# Patient Record
Sex: FEMALE | Race: WHITE
[De-identification: names, ages, dates, MRNs, and addresses within clinical notes are randomized per-mention and may not be internally consistent; named-entity substitution may affect disease eponyms.]

---

## 2021-09-13 ENCOUNTER — APPOINTMENT (OUTPATIENT)
Dept: INTERNAL MEDICINE | Facility: CLINIC | Age: 66
End: 2021-09-13
Payer: MEDICAID

## 2021-09-13 ENCOUNTER — NON-APPOINTMENT (OUTPATIENT)
Age: 66
End: 2021-09-13

## 2021-09-13 VITALS
DIASTOLIC BLOOD PRESSURE: 68 MMHG | HEART RATE: 66 BPM | OXYGEN SATURATION: 96 % | WEIGHT: 110 LBS | BODY MASS INDEX: 20.24 KG/M2 | RESPIRATION RATE: 17 BRPM | SYSTOLIC BLOOD PRESSURE: 114 MMHG | TEMPERATURE: 97.8 F | HEIGHT: 62 IN

## 2021-09-13 DIAGNOSIS — M19.049 PRIMARY OSTEOARTHRITIS, UNSPECIFIED HAND: ICD-10-CM

## 2021-09-13 DIAGNOSIS — Z23 ENCOUNTER FOR IMMUNIZATION: ICD-10-CM

## 2021-09-13 DIAGNOSIS — Z82.49 FAMILY HISTORY OF ISCHEMIC HEART DISEASE AND OTHER DISEASES OF THE CIRCULATORY SYSTEM: ICD-10-CM

## 2021-09-13 DIAGNOSIS — L29.9 PRURITUS, UNSPECIFIED: ICD-10-CM

## 2021-09-13 DIAGNOSIS — Z87.01 PERSONAL HISTORY OF PNEUMONIA (RECURRENT): ICD-10-CM

## 2021-09-13 DIAGNOSIS — K59.09 OTHER CONSTIPATION: ICD-10-CM

## 2021-09-13 DIAGNOSIS — Z80.8 FAMILY HISTORY OF MALIGNANT NEOPLASM OF OTHER ORGANS OR SYSTEMS: ICD-10-CM

## 2021-09-13 DIAGNOSIS — Z83.49 FAMILY HISTORY OF OTHER ENDOCRINE, NUTRITIONAL AND METABOLIC DISEASES: ICD-10-CM

## 2021-09-13 PROCEDURE — 90686 IIV4 VACC NO PRSV 0.5 ML IM: CPT

## 2021-09-13 PROCEDURE — 36415 COLL VENOUS BLD VENIPUNCTURE: CPT

## 2021-09-13 PROCEDURE — 99386 PREV VISIT NEW AGE 40-64: CPT | Mod: 25

## 2021-09-13 PROCEDURE — G0008: CPT

## 2021-09-13 PROCEDURE — 93000 ELECTROCARDIOGRAM COMPLETE: CPT | Mod: 59

## 2021-09-13 PROCEDURE — 99204 OFFICE O/P NEW MOD 45 MIN: CPT | Mod: 25

## 2021-09-13 PROCEDURE — G0444 DEPRESSION SCREEN ANNUAL: CPT | Mod: NC,59

## 2021-09-13 PROCEDURE — G0442 ANNUAL ALCOHOL SCREEN 15 MIN: CPT | Mod: NC,59

## 2021-09-13 RX ORDER — LORATADINE 10 MG
17 TABLET,DISINTEGRATING ORAL
Refills: 0 | Status: ACTIVE | COMMUNITY
Start: 2021-09-13

## 2021-09-13 RX ORDER — SODIUM CHLORIDE 50 MG/G
5 OINTMENT OPHTHALMIC
Refills: 0 | Status: ACTIVE | COMMUNITY
Start: 2021-09-13

## 2021-09-13 RX ORDER — ALPRAZOLAM 1 MG/1
1 TABLET ORAL
Qty: 30 | Refills: 0 | Status: ACTIVE | COMMUNITY
Start: 2021-08-04

## 2021-09-14 LAB
25(OH)D3 SERPL-MCNC: 29.4 NG/ML
ALBUMIN SERPL ELPH-MCNC: 4.5 G/DL
ALP BLD-CCNC: 80 U/L
ALT SERPL-CCNC: 18 U/L
ANION GAP SERPL CALC-SCNC: 14 MMOL/L
APPEARANCE: CLEAR
AST SERPL-CCNC: 22 U/L
BASOPHILS # BLD AUTO: 0.06 K/UL
BASOPHILS NFR BLD AUTO: 0.9 %
BILIRUB SERPL-MCNC: 0.6 MG/DL
BILIRUBIN URINE: NEGATIVE
BLOOD URINE: NEGATIVE
BUN SERPL-MCNC: 17 MG/DL
CALCIUM SERPL-MCNC: 9.5 MG/DL
CHLORIDE SERPL-SCNC: 99 MMOL/L
CHOLEST SERPL-MCNC: 236 MG/DL
CO2 SERPL-SCNC: 25 MMOL/L
COLOR: COLORLESS
COVID-19 SPIKE DOMAIN ANTIBODY INTERPRETATION: POSITIVE
CREAT SERPL-MCNC: 0.56 MG/DL
EOSINOPHIL # BLD AUTO: 0.22 K/UL
EOSINOPHIL NFR BLD AUTO: 3.2 %
ESTIMATED AVERAGE GLUCOSE: 114 MG/DL
GLUCOSE QUALITATIVE U: NEGATIVE
GLUCOSE SERPL-MCNC: 91 MG/DL
HBA1C MFR BLD HPLC: 5.6 %
HCT VFR BLD CALC: 42 %
HDLC SERPL-MCNC: 110 MG/DL
HGB BLD-MCNC: 13.4 G/DL
IMM GRANULOCYTES NFR BLD AUTO: 0.4 %
KETONES URINE: NEGATIVE
LDLC SERPL CALC-MCNC: 117 MG/DL
LEUKOCYTE ESTERASE URINE: NEGATIVE
LYMPHOCYTES # BLD AUTO: 2.08 K/UL
LYMPHOCYTES NFR BLD AUTO: 30.3 %
MAN DIFF?: NORMAL
MCHC RBC-ENTMCNC: 31.1 PG
MCHC RBC-ENTMCNC: 31.9 GM/DL
MCV RBC AUTO: 97.4 FL
MONOCYTES # BLD AUTO: 0.59 K/UL
MONOCYTES NFR BLD AUTO: 8.6 %
NEUTROPHILS # BLD AUTO: 3.89 K/UL
NEUTROPHILS NFR BLD AUTO: 56.6 %
NITRITE URINE: NEGATIVE
NONHDLC SERPL-MCNC: 126 MG/DL
PH URINE: 7.5
PLATELET # BLD AUTO: 294 K/UL
POTASSIUM SERPL-SCNC: 4.4 MMOL/L
PROT SERPL-MCNC: 6.9 G/DL
PROTEIN URINE: NEGATIVE
RBC # BLD: 4.31 M/UL
RBC # FLD: 12.2 %
SARS-COV-2 AB SERPL IA-ACNC: >250 U/ML
SODIUM SERPL-SCNC: 138 MMOL/L
SPECIFIC GRAVITY URINE: 1.01
TRIGL SERPL-MCNC: 48 MG/DL
TSH SERPL-ACNC: 2.01 UIU/ML
UROBILINOGEN URINE: NORMAL
VIT B12 SERPL-MCNC: 641 PG/ML
WBC # FLD AUTO: 6.87 K/UL

## 2021-09-18 PROBLEM — K59.09 CHRONIC CONSTIPATION: Status: ACTIVE | Noted: 2021-09-18

## 2021-09-18 PROBLEM — M19.049 OSTEOARTHRITIS, HAND: Status: ACTIVE | Noted: 2021-09-18

## 2021-09-18 NOTE — PHYSICAL EXAM
[No Acute Distress] : no acute distress [Well Nourished] : well nourished [Well Developed] : well developed [Well-Appearing] : well-appearing [Normal Sclera/Conjunctiva] : normal sclera/conjunctiva [PERRL] : pupils equal round and reactive to light [EOMI] : extraocular movements intact [Normal Outer Ear/Nose] : the outer ears and nose were normal in appearance [Normal Oropharynx] : the oropharynx was normal [No JVD] : no jugular venous distention [No Lymphadenopathy] : no lymphadenopathy [Supple] : supple [Thyroid Normal, No Nodules] : the thyroid was normal and there were no nodules present [No Respiratory Distress] : no respiratory distress  [No Accessory Muscle Use] : no accessory muscle use [Clear to Auscultation] : lungs were clear to auscultation bilaterally [Normal Rate] : normal rate  [Regular Rhythm] : with a regular rhythm [Normal S1, S2] : normal S1 and S2 [No Murmur] : no murmur heard [No Carotid Bruits] : no carotid bruits [No Abdominal Bruit] : a ~M bruit was not heard ~T in the abdomen [No Varicosities] : no varicosities [Pedal Pulses Present] : the pedal pulses are present [No Edema] : there was no peripheral edema [No Palpable Aorta] : no palpable aorta [No Extremity Clubbing/Cyanosis] : no extremity clubbing/cyanosis [Normal Appearance] : normal in appearance [No Nipple Discharge] : no nipple discharge [No Axillary Lymphadenopathy] : no axillary lymphadenopathy [Soft] : abdomen soft [Non Tender] : non-tender [Non-distended] : non-distended [No Masses] : no abdominal mass palpated [No HSM] : no HSM [Normal Bowel Sounds] : normal bowel sounds [Normal Posterior Cervical Nodes] : no posterior cervical lymphadenopathy [Normal Anterior Cervical Nodes] : no anterior cervical lymphadenopathy [No CVA Tenderness] : no CVA  tenderness [No Spinal Tenderness] : no spinal tenderness [No Joint Swelling] : no joint swelling [Grossly Normal Strength/Tone] : grossly normal strength/tone [No Rash] : no rash [Coordination Grossly Intact] : coordination grossly intact [No Focal Deficits] : no focal deficits [Normal Gait] : normal gait [Deep Tendon Reflexes (DTR)] : deep tendon reflexes were 2+ and symmetric [Normal Affect] : the affect was normal [Normal Insight/Judgement] : insight and judgment were intact [de-identified] : diffuse oa changes

## 2021-09-18 NOTE — ASSESSMENT
[FreeTextEntry1] : Gen. Pruritis- cont gabapentin\par Corneal Abrasion- f/u with ophto, cont dorina\par Osteoarthritis - ortho f/u, tylenol\par Anxiety- stable, xanax use sparingly\par Chronic Constipation- fluids/fiber/ exercise, miralax, GI f/u\par Eczema- elocon\par Chronic back pain- dr jasbir munoz, sympt tx\par \par Discussed healthy lifestyle, updated vaccinations, healthy diet, exercise, chk labs, discussed appropriate screening tests including colonoscopy, mammo, bone density and pap.\par

## 2021-09-18 NOTE — HEALTH RISK ASSESSMENT
[No] : No [Patient reported mammogram was normal] : Patient reported mammogram was normal [Patient reported PAP Smear was normal] : Patient reported PAP Smear was normal [Patient reported bone density results were abnormal] : Patient reported bone density results were abnormal [Patient reported colonoscopy was normal] : Patient reported colonoscopy was normal [Good] : ~his/her~  mood as  good [] : No [0] : 2) Feeling down, depressed, or hopeless: Not at all (0) [PHQ-2 Negative - No further assessment needed] : PHQ-2 Negative - No further assessment needed [Audit-CScore] : 0 [[id number]] : 0 [None] : None [With Significant Other] : lives with significant other [] :  [Fully functional (bathing, dressing, toileting, transferring, walking, feeding)] : Fully functional (bathing, dressing, toileting, transferring, walking, feeding) [Fully functional (using the telephone, shopping, preparing meals, housekeeping, doing laundry, using] : Fully functional and needs no help or supervision to perform IADLs (using the telephone, shopping, preparing meals, housekeeping, doing laundry, using transportation, managing medications and managing finances) [Reports changes in hearing] : Reports no changes in hearing [Reports changes in vision] : Reports no changes in vision [MammogramDate] : 2021 [PapSmearDate] : 2021 [BoneDensityDate] : 2011 [BoneDensityComments] : osteoperosis [ColonoscopyDate] : 2019

## 2021-09-18 NOTE — HISTORY OF PRESENT ILLNESS
[FreeTextEntry1] : Here for first visit to establish care.  [de-identified] : glaucoma\par corneal abrasion\par chronic constipation\par chronic back pain-uses topical heating patch, doesn’t really have any radic sympt. no b/b incont. \par oa hands.\par diffuse eczema- sites vary, elocon works.\par anxiety- mild/mod, able to function- dealing with husbands illness, financial issues- bothers her at night, gives her insomnia. \par chronic knee pain bilat- saw ortho. ? arthritis.

## 2021-11-24 ENCOUNTER — APPOINTMENT (OUTPATIENT)
Dept: PHYSICAL MEDICINE AND REHAB | Facility: CLINIC | Age: 66
End: 2021-11-24
Payer: OTHER MISCELLANEOUS

## 2021-11-24 VITALS
HEART RATE: 66 BPM | BODY MASS INDEX: 18.58 KG/M2 | RESPIRATION RATE: 16 BRPM | DIASTOLIC BLOOD PRESSURE: 78 MMHG | WEIGHT: 101 LBS | SYSTOLIC BLOOD PRESSURE: 130 MMHG | OXYGEN SATURATION: 96 % | HEIGHT: 62 IN | TEMPERATURE: 97.5 F

## 2021-11-24 DIAGNOSIS — S20.211A CONTUSION OF RIGHT FRONT WALL OF THORAX, INITIAL ENCOUNTER: ICD-10-CM

## 2021-11-24 PROCEDURE — 99072 ADDL SUPL MATRL&STAF TM PHE: CPT

## 2021-11-24 PROCEDURE — 99205 OFFICE O/P NEW HI 60 MIN: CPT

## 2021-11-28 PROBLEM — S20.211A CONTUSION OF RIB, RIGHT, INITIAL ENCOUNTER: Status: ACTIVE | Noted: 2021-11-24

## 2021-11-28 NOTE — HISTORY OF PRESENT ILLNESS
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  No LOc.  she landed on her knees.  She had pain in the left side of her upper back and her right knee.  She had a bruise in the right upper back and left knee.  \par She did not go to the hospital from r the scene.\par She thought her pains would resolve. The bruises resolved.  The left knee pain is improving.  She has pain in the right upper back\par The pain continues and she presents for an evaluation\par \par upper back pain right ribs and shoulder\par Pain:  7/10 Worse: 7/10 Quality: sharp  Frequency: constant\par The pain starts over the right lateral upper back over the shoulder blade and radiates to the front of her chest\par The pain is worse with sudden twist/straightening of the trunk.  Laying on the right side also increases pain\par \par Left knee pain\par Pain: 5/10 quality: Deep ache resolving ecchymosis\par The pain starts over the medial knee without radiation.  The pain is worse with touch and weight-bear through the knee.  She denies knee buckling.\par \par she does not like to take pain medication\par \par No WC case previously\par No upper back injury\par Functional deficits: She can perform ADLs but slowly.  She has increased pain with extremity dress and use of the right arm for ADLs such as brushing her teeth and combing her hair\par \par She did not take time off work\par She drove to the office\par \par \par  \par Functional deficit\par pushing off the arms\par takes longer to perform adls\par

## 2021-11-28 NOTE — PHYSICAL EXAM
[FreeTextEntry1] : Pleasant, in no distress. Language: English\par HEENT: Head: no trauma. Eyes: no discharge. Ears: No discharge. Nose No discharge. Throat: clear\par Neck: FAROM. Negative Spurlings\par Heart: RR, +S1, S2\par Lungs: CTA\par Abdomen: soft, NT\par Lumbar spine: FAROM, no spasm\par \par LUE: Shoulder: FAROM, MS 5/5\par Elbow: FAROM, MS 5/5 reflexes 2/4\par Wrist: FAROM, MS 5/5 reflexes 2/4\par Warm, nontender, pulse 2+\par \par RUE:Shoulder:FAROM MS 4+/5 to palpation of the left lateral shoulder and over the shoulder blade.  No ecchymosis.\par Elbow: FAROM, MS 5/5 reflexes 2/4\par Wrist: FAROM, MS 5/5 reflexes 2/4\par Warm, nontender, pulse 2+\par \par LLE: Hip: FAROM, MS 5/5\par Knee: FAROM, MS 4+/5 tender to touch.  No instability.  Tender over the medial knee.  Negative Dimas's.  Resolving ecchymosis.  Reflexes 2/4\par Ankle: FAROM, MS 5/5 reflexes 2/4\par Warm , nontender, pulse 2+ negative homans\par \par RLE: Hip: FAROM, MS 5/5\par Knee: FAROM, MS 5/5 reflexes 2/4\par Ankle: FAROM, MS 5/5 reflexes 2/4\par Warm , nontender, pulse 2+ negative homans\par \par Gait: Spontaneous, reciprocal, safe without an assistive device\par \par Sensation\par RUE: sensation is intact to light touch, pinprick  and proprioception\par LUE: sensation is intact to light touch, pinprick  and proprioception\par RLE: sensation is intact to light touch, pinprick  and proprioception. Neg SLR. Neg JOSE RAMON, Neg FADIR\par LLE: sensation is intact to light touch, pinprick  and proprioception. Neg SLR. Neg JOSE RAMON, Neg FADIR\par \par

## 2021-12-23 ENCOUNTER — APPOINTMENT (OUTPATIENT)
Dept: PHYSICAL MEDICINE AND REHAB | Facility: CLINIC | Age: 66
End: 2021-12-23
Payer: OTHER MISCELLANEOUS

## 2021-12-23 VITALS
WEIGHT: 100 LBS | DIASTOLIC BLOOD PRESSURE: 75 MMHG | HEART RATE: 65 BPM | OXYGEN SATURATION: 96 % | HEIGHT: 62 IN | BODY MASS INDEX: 18.4 KG/M2 | SYSTOLIC BLOOD PRESSURE: 116 MMHG | RESPIRATION RATE: 16 BRPM | TEMPERATURE: 96.6 F

## 2021-12-23 DIAGNOSIS — M54.6 PAIN IN THORACIC SPINE: ICD-10-CM

## 2021-12-23 PROCEDURE — 99072 ADDL SUPL MATRL&STAF TM PHE: CPT

## 2021-12-23 PROCEDURE — 99214 OFFICE O/P EST MOD 30 MIN: CPT

## 2021-12-27 PROBLEM — M54.6 THORACIC SPINE PAIN: Status: ACTIVE | Noted: 2021-11-24

## 2021-12-27 NOTE — DATA REVIEWED
[FreeTextEntry1] : Rib x-rays performed on 11/24/2021 reveals no fracture\par \par X-rays of the thoracic spine performed on 11/24/2021 reveals mild left adult scoliosis Detail Level: Generalized Quality 130: Documentation Of Current Medications In The Medical Record: Current Medications Documented

## 2021-12-27 NOTE — HISTORY OF PRESENT ILLNESS
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  No LOC.  She landed on her knees.  She had pain in the left side of her upper back and her right knee.  She had a bruise in the right upper back and left knee.  \par She did not go to the hospital from  the scene.\par She thought her pains would resolve. The bruises resolved.  The left knee pain is improving.  She has pain in the right upper back\par The pain continues and she presents for an evaluation\par She works at [employer].  She has been working long hours due to the [place] holiday season.  She has not been able to attend restorative physical therapy\par \par upper back pain right ribs and shoulder\par Pain:  8/10 Worse: 7/10 Quality: sharp  Frequency: constant\par The pain starts over the right lateral upper back over the shoulder blade and radiates to the front of her chest\par The pain is worse with sudden twist/straightening of the trunk.  Laying on the right side also increases pain\par Lifting more than 10 pounds with her arms increases her pain\par \par Left knee pain\par Pain: 4/10 quality: Deep ache resolving ecchymosis\par The pain starts over the medial knee without radiation.  The pain is worse with touch and weight-bear through the knee.  She denies knee buckling.  She has pain with kneeling and use of stairs.\par \par She does not like to take pain medication\par \par No WC case previously\par No upper back injury\par Functional deficits: She can perform ADLs but slowly.  She has increased pain with extremity dress and use of the right arm for ADLs such as brushing her teeth and combing her hair\par \par She did not take time off work\par She drove to the office\par \par meloxicam 15 due to work \par  \par Functional deficit\par pushing off the arms\par takes longer to perform adls\par

## 2021-12-27 NOTE — PHYSICAL EXAM
[FreeTextEntry1] : Pleasant, in no distress. Language: English\par HEENT: Head: no trauma. Eyes: no discharge. Ears: No discharge. Nose No discharge. Throat: clear\par Neck: FAROM. Negative Spurlings\par Heart: RR, +S1, S2\par Lungs: CTA\par Abdomen: soft, NT\par Thoracic spine.  She has tenderness on palpation of the paraspinal muscles.\par Lumbar spine: FAROM, no spasm\par \par LUE: Shoulder: FAROM, MS 5/5\par Elbow: FAROM, MS 5/5 reflexes 2/4\par Wrist: FAROM, MS 5/5 reflexes 2/4\par Warm, nontender, pulse 2+\par \par RUE:Shoulder:FAROM MS 4+/5 to palpation of the left lateral shoulder and over the shoulder blade.  No ecchymosis.\par Elbow: FAROM, MS 5/5 reflexes 2/4\par Wrist: FAROM, MS 5/5 reflexes 2/4\par Warm, nontender, pulse 2+\par \par LLE: Hip: FAROM, MS 5/5\par Knee: FAROM, MS 4+/5 tender to touch.  No instability.  Tender over the medial knee.  Negative Dimas's.  Resolving ecchymosis.  Reflexes 2/4\par Ankle: FAROM, MS 5/5 reflexes 2/4\par Warm , nontender, pulse 2+ negative homans\par \par RLE: Hip: FAROM, MS 5/5\par Knee: FAROM, MS 5/5 reflexes 2/4\par Ankle: FAROM, MS 5/5 reflexes 2/4\par Warm , nontender, pulse 2+ negative homans\par \par Gait: Spontaneous, reciprocal, safe without an assistive device\par \par Sensation\par RUE: sensation is intact to light touch, pinprick  and proprioception\par LUE: sensation is intact to light touch, pinprick  and proprioception\par RLE: sensation is intact to light touch, pinprick  and proprioception. Neg SLR. Neg JOSE RAMON, Neg FADIR\par LLE: sensation is intact to light touch, pinprick  and proprioception. Neg SLR. Neg JOSE RAMON, Neg FADIR\par \par

## 2022-01-24 ENCOUNTER — APPOINTMENT (OUTPATIENT)
Dept: PHYSICAL MEDICINE AND REHAB | Facility: CLINIC | Age: 67
End: 2022-01-24
Payer: OTHER MISCELLANEOUS

## 2022-01-24 VITALS
SYSTOLIC BLOOD PRESSURE: 116 MMHG | DIASTOLIC BLOOD PRESSURE: 72 MMHG | BODY MASS INDEX: 18.58 KG/M2 | WEIGHT: 101 LBS | TEMPERATURE: 97.4 F | HEIGHT: 62 IN | HEART RATE: 68 BPM | OXYGEN SATURATION: 99 % | RESPIRATION RATE: 17 BRPM

## 2022-01-24 PROCEDURE — 99072 ADDL SUPL MATRL&STAF TM PHE: CPT

## 2022-01-24 PROCEDURE — 99213 OFFICE O/P EST LOW 20 MIN: CPT

## 2022-01-25 NOTE — DATA REVIEWED
[FreeTextEntry1] : Rib x-rays performed on 11/24/2021 reveals no fracture\par \par X-rays of the thoracic spine performed on 11/24/2021 reveals mild left adult scoliosis

## 2022-01-25 NOTE — PHYSICAL EXAM
[FreeTextEntry1] : Pleasant, in no distress. Language: English\par HEENT: Head: no trauma. Eyes: no discharge. Ears: No discharge. Nose No discharge. Throat: clear\par Neck: FAROM. Negative Spurlings\par Heart: RR, +S1, S2\par Lungs: CTA\par Abdomen: soft, NT\par Thoracic spine.  She has tenderness on palpation of the paraspinal musclesover to the lateral chest wall\par Lumbar spine: FAROM, no spasm\par \par LUE: Shoulder: FAROM, MS 5/5\par Elbow: FAROM, MS 5/5 reflexes 2/4\par Wrist: FAROM, MS 5/5 reflexes 2/4\par Warm, nontender, pulse 2+\par \par RUE:Shoulder:FAROM MS 4+/5 to palpation of the left lateral shoulder and over the shoulder blade.  No ecchymosis. tender with spasm in the interscapular muscles\par Elbow: FAROM, MS 5/5 reflexes 2/4\par Wrist: FAROM, MS 5/5 reflexes 2/4\par Warm, nontender, pulse 2+\par \par LLE: Hip: FAROM, MS 5/5\par Knee: FAROM, MS 4+/5 tender to touch.  No instability.  mild Tender over the medial knee.  Negative Dimas's.  Resolving ecchymosis.  Reflexes 2/4\par Ankle: FAROM, MS 5/5 reflexes 2/4\par Warm , nontender, pulse 2+ negative homans\par \par RLE: Hip: FAROM, MS 5/5\par Knee: FAROM, MS 5/5 reflexes 2/4\par Ankle: FAROM, MS 5/5 reflexes 2/4\par Warm , nontender, pulse 2+ negative homans\par \par Gait: Spontaneous, reciprocal, safe without an assistive device\par \par Sensation\par RUE: sensation is intact to light touch, pinprick  and proprioception\par LUE: sensation is intact to light touch, pinprick  and proprioception\par RLE: sensation is intact to light touch, pinprick  and proprioception. Neg SLR. Neg JOSE RAOMN, Neg FADIR\par LLE: sensation is intact to light touch, pinprick  and proprioception. Neg SLR. Neg JOSE RAMON, Neg FADIR\par \par

## 2022-01-25 NOTE — HISTORY OF PRESENT ILLNESS
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  No LOC.  She landed on her knees.  She had pain in the left side of her upper back and her right knee.  She had a bruise in the right upper back and left knee.  \par She did not go to the hospital from  the scene.\par She thought her pains would resolve. The bruises resolved.  The left knee pain is improving.  She has pain in the right upper back\par \par She works at [employer].  She has been working long hours due to the Christmas holiday season. The work has finally slowed down so now she can  attend therapy. She has beento   restorative physical therapy 3 sessions since her last office visit. Her pain is slightly less\par \par upper back pain right ribs and shoulder\par Pain: 6/10 Worse: 7/10 Quality: sharp  Frequency: constant\par The pain starts over the right lateral upper back over the shoulder blade and radiates to the front of her chest\par The pain is worse with sudden twist/straightening of the trunk.  Laying on the right side also increases pain\par Lifting more than 10 pounds with her arms increases her pain\par \par Left knee pain\par Pain: 1/10 quality: Deep ache resolving ecchymosis\par The pain starts over the medial knee without radiation.  The pain is worse with touch and weight-bear through the knee.  She denies knee buckling.  She has pain with kneeling and use of stairs.\par \par She does not like to take pain medication\par \par No WC case previously\par No upper back injury\par Functional deficits: She can perform ADLs but slowly.  She has increased pain with extremity dress and use of the right arm for ADLs such as brushing her teeth and combing her hair\par \par She did not take time off work\par She drove to the office\par \par meloxicam 15 due to work \par  \par Functional deficit\par pushing off the arms\par takes longer to perform adls\par

## 2022-03-02 ENCOUNTER — APPOINTMENT (OUTPATIENT)
Dept: PHYSICAL MEDICINE AND REHAB | Facility: CLINIC | Age: 67
End: 2022-03-02
Payer: OTHER MISCELLANEOUS

## 2022-03-02 DIAGNOSIS — M25.562 PAIN IN LEFT KNEE: ICD-10-CM

## 2022-03-02 PROCEDURE — 99072 ADDL SUPL MATRL&STAF TM PHE: CPT

## 2022-03-02 PROCEDURE — 99214 OFFICE O/P EST MOD 30 MIN: CPT

## 2022-03-06 PROBLEM — M25.562 ACUTE PAIN OF LEFT KNEE: Status: ACTIVE | Noted: 2021-11-28

## 2022-03-06 NOTE — REVIEW OF SYSTEMS
[Joint Pain] : joint pain [Joint Stiffness] : joint stiffness [Muscle Pain] : muscle pain [Fever] : no fever [Eye Pain] : no eye pain [Earache] : no earache [Chest Pain] : no chest pain [Shortness Of Breath] : no shortness of breath [Abdominal Pain] : no abdominal pain [Dysuria] : no dysuria [Skin Wound] : no skin wound [Insomnia] : no insomnia [Dizziness] : no dizziness [Easy Bruising] : no tendency for easy bruising

## 2022-03-06 NOTE — HISTORY OF PRESENT ILLNESS
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  No LOC.  She landed on her knees.  She had pain in the left side of her upper back and her right knee.  She had a bruise in the right upper back and left knee.  \par She did not go to the hospital from  the scene.\par She thought her pains would resolve. The bruises resolved.  The left knee pain is improving.  She has pain in the right upper back\par \par She works at [employer].  She has attended restorative physical therapy 2 times a week for 8 sessions since her last office visit.  11 sessions total.  Her left knee pain is improved her right shoulder pain remains strong and limits her ADLs\par \par upper back pain right ribs and shoulder\par Pain: 6/10 Worse: 7/10 Quality: sharp  Frequency: constant\par The pain starts over the right lateral upper back over the shoulder blade and radiates to the front of her chest\par The pain is worse with sudden twist/straightening of the trunk.  Laying on the right side also increases pain\par Lifting more than 10 pounds with her arms increases her pain\par \par Left knee pain\par Pain: 1/10 quality: Deep ache resolving ecchymosis\par The pain starts over the medial knee without radiation.  The pain is worse with touch and weight-bear through the knee.  She denies knee buckling.  She has pain with kneeling and use of stairs.\par \par She does not like to take pain medication\par \par No WC case previously\par No upper back injury\par Functional deficits: She can perform ADLs but slowly.  She has increased pain with extremity dress and use of the right arm for ADLs such as brushing her teeth and combing her hair\par \par She did not take time off work\par She drove to the office\par \par meloxicam 15 due to work \par  \par Functional deficit\par pushing off the arms\par takes longer to perform adls\par

## 2022-03-06 NOTE — HISTORY OF PRESENT ILLNESS
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  No LOC.  She landed on her knees.  She had pain in the left side of her upper back and her right knee.  She had a bruise in the right upper back and left knee.  \par She did not go to the hospital from  the scene.\par She thought her pains would resolve. The bruises resolved.  The left knee pain is improving.  She has pain in the right upper back\par \par She works at [employer].  She has attended restorative physical therapy 2 times a week for 8 sessions since her last office visit.  11 sessions total.  Her left knee pain is improved her right shoulder pain remains strong and limits her ADLs\par \par upper back pain right ribs and shoulder\par Pain: 6/10 Worse: 7/10 Quality: sharp  Frequency: constant\par The pain starts over the right lateral upper back over the shoulder blade and radiates to the front of her chest\par The pain is worse with sudden twist/straightening of the trunk.  Laying on the right side also increases pain\par Lifting more than 10 pounds with her arms increases her pain\par \par Left knee pain\par Pain: 1/10 quality: Deep ache resolving ecchymosis\par The pain starts over the medial knee without radiation.  The pain is worse with touch and weight-bear through the knee.  She denies knee buckling.  She has pain with kneeling and use of stairs.\par \par She does not like to take pain medication\par \par No WC case previously\par No upper back injury\par Functional deficits: She can perform ADLs but slowly.  She has increased pain with extremity dress and use of the right arm for ADLs such as brushing her teeth and combing her hair\par \par She did not take time off work\par She drove to the office\par \par meloxicam 15 due to work \par  \par Functional deficit\par pushing off the arms\par takes longer to perform adls\par

## 2022-03-06 NOTE — PHYSICAL EXAM
[FreeTextEntry1] : Pleasant, in no distress. Language: English\par HEENT: Head: no trauma. Eyes: no discharge. Ears: No discharge. Nose No discharge. Throat: clear\par Neck: FAROM. Negative Spurlings\par Heart: RR, +S1, S2\par Lungs: CTA\par Abdomen: soft, NT\par Thoracic spine.  She has tenderness on palpation of the paraspinal musclesover to the lateral chest wall\par Lumbar spine: FAROM, no spasm\par \par LUE: Shoulder: FAROM, MS 5/5\par Elbow: FAROM, MS 5/5 reflexes 2/4\par Wrist: FAROM, MS 5/5 reflexes 2/4\par Warm, nontender, pulse 2+\par \par RUE:Shoulder:FAROM MS 4+/5 to palpation of the left lateral shoulder and over the shoulder blade.  No ecchymosis. tender with spasm in the interscapular muscles\par Elbow: FAROM, MS 5/5 reflexes 2/4\par Wrist: FAROM, MS 5/5 reflexes 2/4\par Warm, nontender, pulse 2+\par \par LLE: Hip: FAROM, MS 5/5\par Knee: FAROM, MS 4+/5 tender to touch.  No instability.  mild Tender over the medial knee.  Negative Dimas's.  Resolving ecchymosis.  Reflexes 2/4\par Ankle: FAROM, MS 5/5 reflexes 2/4\par Warm , nontender, pulse 2+ negative homans\par \par RLE: Hip: FAROM, MS 5/5\par Knee: FAROM, MS 5/5 reflexes 2/4\par Ankle: FAROM, MS 5/5 reflexes 2/4\par Warm , nontender, pulse 2+ negative homans\par \par Gait: Spontaneous, reciprocal, safe without an assistive device\par \par Sensation\par RUE: sensation is intact to light touch, pinprick  and proprioception\par LUE: sensation is intact to light touch, pinprick  and proprioception\par RLE: sensation is intact to light touch, pinprick  and proprioception. Neg SLR. Neg JOSE RAMON, Neg FADIR\par LLE: sensation is intact to light touch, pinprick  and proprioception. Neg SLR. Neg JOSE RAMON, Neg FADIR\par \par

## 2022-11-07 ENCOUNTER — APPOINTMENT (OUTPATIENT)
Dept: INTERNAL MEDICINE | Facility: CLINIC | Age: 67
End: 2022-11-07

## 2022-11-14 ENCOUNTER — APPOINTMENT (OUTPATIENT)
Dept: PHYSICAL MEDICINE AND REHAB | Facility: CLINIC | Age: 67
End: 2022-11-14

## 2022-11-14 DIAGNOSIS — M54.12 RADICULOPATHY, CERVICAL REGION: ICD-10-CM

## 2022-11-14 PROCEDURE — 99214 OFFICE O/P EST MOD 30 MIN: CPT

## 2022-11-14 PROCEDURE — 99072 ADDL SUPL MATRL&STAF TM PHE: CPT

## 2022-11-16 PROBLEM — M54.12 CERVICAL RADICULOPATHY: Status: ACTIVE | Noted: 2022-11-16

## 2022-11-16 NOTE — HISTORY OF PRESENT ILLNESS
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 \par \par She was in the process of pulling a full heavy rack of returned clothes. The wheels on the rack became stuck in the back would not move. , She pulled in the rack to get it to move. She had a sharp pain in the right shoulder and neck.\par She tried to treat with rest, Advil/Tylenol and avoiding tasks that would aggravate right neck and shoulder\par She has persistent pain and presents for an evaluation\par \par Pain:  8/10 Worse: 10/10 Quality: sharp  Frequency: constant\par the pain starts in the front of the front of the shoulder radiates down to the arm to the elbow and to the neck\par The pain is aggravated with use of her arm away from the body.  \par \par She previously had a Worker's Comp. injury on November 7, 2021 along the upper back and right shoulder.  She required restorative physical therapy.  She stopped therapy in March 2022.  She returned to work at full work capacity without further need of medical care\par \par She has no previous injuries or Worker's Comp. cases to her neck\par \par Functional deficits: She can perform ADLs but slowly.  She has increased pain with extremity dress and use of the right arm for ADLs such as brushing her teeth and combing her hair pushing off arms and \par \par She did not take time off from work.\par She drove to the office.\par She does not like to take pain medications

## 2022-12-13 ENCOUNTER — NON-APPOINTMENT (OUTPATIENT)
Age: 67
End: 2022-12-13

## 2022-12-13 ENCOUNTER — APPOINTMENT (OUTPATIENT)
Dept: INTERNAL MEDICINE | Facility: CLINIC | Age: 67
End: 2022-12-13

## 2022-12-13 VITALS
HEART RATE: 78 BPM | SYSTOLIC BLOOD PRESSURE: 112 MMHG | TEMPERATURE: 96.9 F | WEIGHT: 101 LBS | RESPIRATION RATE: 17 BRPM | BODY MASS INDEX: 18.58 KG/M2 | HEIGHT: 62 IN | DIASTOLIC BLOOD PRESSURE: 72 MMHG | OXYGEN SATURATION: 99 %

## 2022-12-13 DIAGNOSIS — L30.9 DERMATITIS, UNSPECIFIED: ICD-10-CM

## 2022-12-13 DIAGNOSIS — S05.00XA INJURY OF CONJUNCTIVA AND CORNEAL ABRASION W/OUT FOREIGN BODY, UNSPECIFIED EYE, INITIAL ENCOUNTER: ICD-10-CM

## 2022-12-13 PROCEDURE — 36415 COLL VENOUS BLD VENIPUNCTURE: CPT

## 2022-12-13 PROCEDURE — 99213 OFFICE O/P EST LOW 20 MIN: CPT | Mod: 25

## 2022-12-13 PROCEDURE — 90662 IIV NO PRSV INCREASED AG IM: CPT

## 2022-12-13 PROCEDURE — 93000 ELECTROCARDIOGRAM COMPLETE: CPT | Mod: 59

## 2022-12-13 PROCEDURE — G0402 INITIAL PREVENTIVE EXAM: CPT

## 2022-12-13 PROCEDURE — G0008: CPT

## 2022-12-13 RX ORDER — DICLOFENAC SODIUM 1% 10 MG/G
1 GEL TOPICAL
Qty: 150 | Refills: 0 | Status: DISCONTINUED | COMMUNITY
Start: 2022-03-02 | End: 2022-12-13

## 2022-12-13 RX ORDER — MELOXICAM 15 MG/1
15 TABLET ORAL
Qty: 1 | Refills: 0 | Status: DISCONTINUED | COMMUNITY
Start: 2022-11-14 | End: 2022-12-13

## 2022-12-13 RX ORDER — GABAPENTIN 300 MG/1
300 CAPSULE ORAL
Refills: 0 | Status: DISCONTINUED | COMMUNITY
Start: 2021-01-05 | End: 2022-12-13

## 2022-12-13 RX ORDER — MELOXICAM 15 MG/1
15 TABLET ORAL
Qty: 30 | Refills: 0 | Status: DISCONTINUED | COMMUNITY
Start: 2021-11-24 | End: 2022-12-13

## 2022-12-13 NOTE — ASSESSMENT
[FreeTextEntry1] : Corneal Abrasion- f/u with ophto, cont dorina\par Osteoarthritis - ortho f/u, tylenol\par Anxiety- stable, xanax use sparingly\par Chronic Constipation- fluids/fiber/ exercise, miralax, GI f/u\par Eczema- elocon\par \par Discussed healthy lifestyle, updated vaccinations, healthy diet, exercise, chk labs, discussed appropriate screening tests including colonoscopy, mammo, bone density and pap.\par Discussed osteoperosis screening at length- x 10 min . \par Discussed the importance and benefit of a healthy lifestyle including a heart healthy diet such as the Mediterranean diet and regular exercise to try to lose weight as well as 7 hours of sleep nightly.\par \par  Abdominal distention

## 2022-12-13 NOTE — HISTORY OF PRESENT ILLNESS
[FreeTextEntry1] : rosetta [de-identified] : mild intermit asthma- uses alb mdi jst a few times a year, change of seasons. \par Uses xanax very infreq for anxiety/stress. just a few times a year. \par Right Shoulder pain - on the job, seeing dr martell- going to ptx. \par pfizer 3,4,5. \par shingrix x2\par pneumovax 23\par prevnar 13\par corneal scarring recurring -sees specialist\par h/o retinal tears- sees specialist\par lifelong constipaiton- miralax effective. \par eczema- moisturizes, elocon for eruptions.

## 2022-12-13 NOTE — HEALTH RISK ASSESSMENT
[Good] : ~his/her~  mood as  good [Never] : Never [No] : In the past 12 months have you used drugs other than those required for medical reasons? No [No falls in past year] : Patient reported no falls in the past year [0] : 2) Feeling down, depressed, or hopeless: Not at all (0) [PHQ-2 Negative - No further assessment needed] : PHQ-2 Negative - No further assessment needed [Patient declined bone density test] : Patient declined bone density test [None] : None [With Family] : lives with family [Employed] : employed [] :  [Fully functional (bathing, dressing, toileting, transferring, walking, feeding)] : Fully functional (bathing, dressing, toileting, transferring, walking, feeding) [Fully functional (using the telephone, shopping, preparing meals, housekeeping, doing laundry, using] : Fully functional and needs no help or supervision to perform IADLs (using the telephone, shopping, preparing meals, housekeeping, doing laundry, using transportation, managing medications and managing finances) [Relationship: ___] : Relationship: [unfilled] [Audit-CScore] : 0 [de-identified] : active but no exc [de-identified] : healthy [[id number]] : 0 [Change in mental status noted] : No change in mental status noted [Language] : denies difficulty with language [Behavior] : denies difficulty with behavior [Handling Complex Tasks] : denies difficulty handling complex tasks [Reasoning] : denies difficulty with reasoning [Reports changes in hearing] : Reports no changes in hearing [Reports changes in vision] : Reports no changes in vision [Reports changes in dental health] : Reports no changes in dental health [AdvancecareDate] : 12/13/22

## 2022-12-14 LAB
25(OH)D3 SERPL-MCNC: 22.8 NG/ML
ALBUMIN SERPL ELPH-MCNC: 4.5 G/DL
ALP BLD-CCNC: 84 U/L
ALT SERPL-CCNC: 16 U/L
ANION GAP SERPL CALC-SCNC: 9 MMOL/L
APPEARANCE: CLEAR
AST SERPL-CCNC: 22 U/L
BACTERIA: NEGATIVE
BASOPHILS # BLD AUTO: 0.04 K/UL
BASOPHILS NFR BLD AUTO: 0.6 %
BILIRUB SERPL-MCNC: 0.4 MG/DL
BILIRUBIN URINE: NEGATIVE
BLOOD URINE: NEGATIVE
BUN SERPL-MCNC: 17 MG/DL
CALCIUM SERPL-MCNC: 9.7 MG/DL
CHLORIDE SERPL-SCNC: 102 MMOL/L
CHOLEST SERPL-MCNC: 241 MG/DL
CO2 SERPL-SCNC: 27 MMOL/L
COLOR: COLORLESS
CREAT SERPL-MCNC: 0.57 MG/DL
EGFR: 101 ML/MIN/1.73M2
EOSINOPHIL # BLD AUTO: 0.08 K/UL
EOSINOPHIL NFR BLD AUTO: 1.2 %
ESTIMATED AVERAGE GLUCOSE: 120 MG/DL
GLUCOSE QUALITATIVE U: NEGATIVE
GLUCOSE SERPL-MCNC: 102 MG/DL
HBA1C MFR BLD HPLC: 5.8 %
HBV SURFACE AG SER QL: NONREACTIVE
HCT VFR BLD CALC: 43.6 %
HCV AB SER QL: NONREACTIVE
HCV S/CO RATIO: 0.13 S/CO
HDLC SERPL-MCNC: 105 MG/DL
HGB BLD-MCNC: 13.9 G/DL
HYALINE CASTS: 0 /LPF
IMM GRANULOCYTES NFR BLD AUTO: 0 %
KETONES URINE: NEGATIVE
LDLC SERPL CALC-MCNC: 127 MG/DL
LEUKOCYTE ESTERASE URINE: NEGATIVE
LYMPHOCYTES # BLD AUTO: 1.48 K/UL
LYMPHOCYTES NFR BLD AUTO: 21.5 %
MAN DIFF?: NORMAL
MCHC RBC-ENTMCNC: 30.8 PG
MCHC RBC-ENTMCNC: 31.9 GM/DL
MCV RBC AUTO: 96.5 FL
MICROSCOPIC-UA: NORMAL
MONOCYTES # BLD AUTO: 0.35 K/UL
MONOCYTES NFR BLD AUTO: 5.1 %
NEUTROPHILS # BLD AUTO: 4.92 K/UL
NEUTROPHILS NFR BLD AUTO: 71.6 %
NITRITE URINE: NEGATIVE
NONHDLC SERPL-MCNC: 136 MG/DL
PH URINE: 6
PLATELET # BLD AUTO: 303 K/UL
POTASSIUM SERPL-SCNC: 4.5 MMOL/L
PROT SERPL-MCNC: 7 G/DL
PROTEIN URINE: NEGATIVE
RBC # BLD: 4.52 M/UL
RBC # FLD: 12.8 %
RED BLOOD CELLS URINE: 1 /HPF
SODIUM SERPL-SCNC: 137 MMOL/L
SPECIFIC GRAVITY URINE: 1.01
SQUAMOUS EPITHELIAL CELLS: 0 /HPF
T3 SERPL-MCNC: 122 NG/DL
T4 FREE SERPL-MCNC: 1.2 NG/DL
TRIGL SERPL-MCNC: 49 MG/DL
TSH SERPL-ACNC: 2.49 UIU/ML
UROBILINOGEN URINE: NORMAL
VIT B12 SERPL-MCNC: 635 PG/ML
WBC # FLD AUTO: 6.87 K/UL
WHITE BLOOD CELLS URINE: 0 /HPF

## 2022-12-15 ENCOUNTER — NON-APPOINTMENT (OUTPATIENT)
Age: 67
End: 2022-12-15

## 2022-12-15 ENCOUNTER — APPOINTMENT (OUTPATIENT)
Dept: PHYSICAL MEDICINE AND REHAB | Facility: CLINIC | Age: 67
End: 2022-12-15

## 2022-12-15 LAB — BACTERIA UR CULT: NORMAL

## 2023-01-09 ENCOUNTER — APPOINTMENT (OUTPATIENT)
Dept: PHYSICAL MEDICINE AND REHAB | Facility: CLINIC | Age: 68
End: 2023-01-09

## 2023-01-11 ENCOUNTER — APPOINTMENT (OUTPATIENT)
Dept: PHYSICAL MEDICINE AND REHAB | Facility: CLINIC | Age: 68
End: 2023-01-11
Payer: OTHER MISCELLANEOUS

## 2023-01-11 VITALS
SYSTOLIC BLOOD PRESSURE: 110 MMHG | DIASTOLIC BLOOD PRESSURE: 80 MMHG | RESPIRATION RATE: 17 BRPM | WEIGHT: 101 LBS | HEIGHT: 62 IN | TEMPERATURE: 97.2 F | HEART RATE: 70 BPM | OXYGEN SATURATION: 97 % | BODY MASS INDEX: 18.58 KG/M2

## 2023-01-11 PROCEDURE — 99213 OFFICE O/P EST LOW 20 MIN: CPT

## 2023-01-12 NOTE — HISTORY OF PRESENT ILLNESS
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 \par \par She was in the process of pulling a full heavy rack of returned clothes. The wheels on the rack became stuck in the back would not move. She pulled in the rack to get it to move. She had a sharp pain in the right shoulder and neck.\par She tried to treat with rest, Advil/Tylenol and avoiding tasks that would aggravate right neck and shoulder\par She has persistent pain and presents for an evaluation\par \par She attended restorative physical therapy for 4 sessions since her last office visit\par She could not go to therapy due to her very busy and unpredictable work schedule due to the [place] holiday season.  Her job will call her in for extra shifts.\par \par Pain:  6/10 Worse: 10/10 Quality: sharp  Frequency: constant\par Therapy has worked to increase the range of motion of her shoulder.  She still has limitation at end range both in forward flexion/abduction and internal rotation\par the pain starts in the front of the front of the shoulder radiates down to the arm to the elbow and to the neck\par The pain is aggravated with use of her arm away from the body.  She cannot manipulate objects on a shelf due to shoulder pain\par \par She previously had a Worker's Comp. injury on November 7, 2021 along the upper back and right shoulder.  She required restorative physical therapy.  She stopped therapy in March 2022.  She returned to work at full work capacity without further need of medical care\par \par She has no previous injuries or Worker's Comp. cases to her neck\par \par Functional deficits: She can perform ADLs but slowly.  She has increased pain with extremity dress and use of the right arm for ADLs such as brushing her teeth and combing her hair pushing off arms and \par \par She did not take time off from work.\par She drove to the office.\par She does not like to take pain medications

## 2023-01-12 NOTE — DATA REVIEWED
[FreeTextEntry1] : Rib x-rays performed on 11/24/2021 reveals no fracture\par \par X-rays of the thoracic spine performed on 11/24/2021 reveals mild left adult scoliosis\par \par X-ray of the right shoulder performed on November 17, 2022 reveals mild calcific bursitis

## 2023-01-18 ENCOUNTER — APPOINTMENT (OUTPATIENT)
Dept: PHYSICAL MEDICINE AND REHAB | Facility: CLINIC | Age: 68
End: 2023-01-18

## 2023-02-13 ENCOUNTER — APPOINTMENT (OUTPATIENT)
Dept: PHYSICAL MEDICINE AND REHAB | Facility: CLINIC | Age: 68
End: 2023-02-13
Payer: OTHER MISCELLANEOUS

## 2023-02-13 VITALS
DIASTOLIC BLOOD PRESSURE: 72 MMHG | WEIGHT: 101 LBS | TEMPERATURE: 97.4 F | RESPIRATION RATE: 18 BRPM | HEART RATE: 74 BPM | HEIGHT: 62 IN | BODY MASS INDEX: 18.58 KG/M2 | SYSTOLIC BLOOD PRESSURE: 112 MMHG | OXYGEN SATURATION: 98 %

## 2023-02-13 PROCEDURE — 99214 OFFICE O/P EST MOD 30 MIN: CPT

## 2023-02-15 NOTE — HISTORY OF PRESENT ILLNESS
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 \par \par She was in the process of pulling a full heavy rack of returned clothes. The wheels on the rack became stuck in the back would not move. She pulled in the rack to get it to move. She had a sharp pain in the right shoulder and neck.\par She tried to treat with rest, Advil/Tylenol and avoiding tasks that would aggravate right neck and shoulder\par She has persistent pain and presents for an evaluation\par \par She attended restorative physical therapy for  2 x week for sessions for 8 session 22 sessions since her last office visit.  She continues to have pain in her right shoulder.  She has difficulty with lifting objects with the right hand.\par She could not go to therapy due to her very busy and unpredictable work schedule due to the Christmas holiday season.  Her job will call her in for extra shifts.\par \par Pain:  6/10 Worse: 10/10 Quality: sharp  Frequency: constant\par Therapy has worked to increase the range of motion of her shoulder.  She still has limitation at end range both in forward flexion/abduction and internal rotation\par the pain starts in the front of the front of the shoulder radiates down to the arm to the elbow and to the neck\par The pain is aggravated with use of her arm away from the body.  She cannot manipulate objects on a shelf due to shoulder pain\par \par She previously had a Worker's Comp. injury on November 7, 2021 along the upper back and right shoulder.  She required restorative physical therapy.  She stopped therapy in March 2022.  She returned to work at full work capacity without further need of medical care\par \par She has no previous injuries or Worker's Comp. cases to her neck\par \par Functional deficits: She can perform ADLs but slowly.  She has increased pain with extremity dress and use of the right arm for ADLs such as brushing her teeth and combing her hair pushing off arms and \par \par She did not take time off from work.\par She drove to the office.\par She does not like to take pain medications

## 2023-02-22 ENCOUNTER — NON-APPOINTMENT (OUTPATIENT)
Age: 68
End: 2023-02-22

## 2023-03-13 ENCOUNTER — APPOINTMENT (OUTPATIENT)
Dept: PHYSICAL MEDICINE AND REHAB | Facility: CLINIC | Age: 68
End: 2023-03-13
Payer: OTHER MISCELLANEOUS

## 2023-03-13 VITALS
RESPIRATION RATE: 18 BRPM | TEMPERATURE: 97.1 F | HEIGHT: 62 IN | SYSTOLIC BLOOD PRESSURE: 112 MMHG | WEIGHT: 102 LBS | BODY MASS INDEX: 18.77 KG/M2 | OXYGEN SATURATION: 100 % | HEART RATE: 72 BPM | DIASTOLIC BLOOD PRESSURE: 70 MMHG

## 2023-03-13 PROCEDURE — 99072 ADDL SUPL MATRL&STAF TM PHE: CPT

## 2023-03-13 PROCEDURE — 99213 OFFICE O/P EST LOW 20 MIN: CPT

## 2023-03-14 ENCOUNTER — APPOINTMENT (OUTPATIENT)
Dept: INTERNAL MEDICINE | Facility: CLINIC | Age: 68
End: 2023-03-14
Payer: MEDICARE

## 2023-03-14 VITALS
DIASTOLIC BLOOD PRESSURE: 72 MMHG | WEIGHT: 100 LBS | BODY MASS INDEX: 18.4 KG/M2 | OXYGEN SATURATION: 97 % | RESPIRATION RATE: 18 BRPM | TEMPERATURE: 96.3 F | HEART RATE: 76 BPM | SYSTOLIC BLOOD PRESSURE: 114 MMHG | HEIGHT: 62 IN

## 2023-03-14 PROCEDURE — 99213 OFFICE O/P EST LOW 20 MIN: CPT | Mod: 25

## 2023-03-14 PROCEDURE — 36415 COLL VENOUS BLD VENIPUNCTURE: CPT

## 2023-03-14 NOTE — HISTORY OF PRESENT ILLNESS
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 \par \par She was in the process of pulling a full heavy rack of returned clothes. The wheels on the rack became stuck in the back would not move. She pulled in the rack to get it to move. She had a sharp pain in the right shoulder and neck.\par She tried to treat with rest, Advil/Tylenol and avoiding tasks that would aggravate right neck and shoulder\par She has persistent pain and presents for an evaluation\par \par She attended restorative physical therapy for  2 x week for sessions for 8 session 22 sessions .  She has not been allowed any more therapy sessions since her last office visit..  \par \par She has increased pain in her right shoulder.  She continues to work at full work capacity at a [employer] which involves repetitive lifting\par \par She is pending decision on her right shoulder MRI.She continues to have pain in her right shoulder.  She has difficulty with lifting objects with the right hand.\par \par Pain:  9/10 Worse: 10/10 Quality: sharp  Frequency: constant\par Therapy has worked to increase the range of motion of her shoulder.  She still has limitation at end range both in forward flexion/abduction and internal rotation\par the pain starts in the front of the front of the shoulder radiates down to the arm to the elbow and to the neck\par The pain is aggravated with use of her arm away from the body.  She cannot manipulate objects on a shelf due to shoulder pain\par \par She previously had a Worker's Comp. injury on November 7, 2021 along the upper back and right shoulder.  She required restorative physical therapy.  She stopped therapy in March 2022.  She returned to work at full work capacity without further need of medical care\par \par She has no previous injuries or Worker's Comp. cases to her neck\par \par Functional deficits: She can perform ADLs but slowly.  She has increased pain with extremity dress and use of the right arm for ADLs such as brushing her teeth and combing her hair pushing off arms and \par \par She did not take time off from work.\par She drove to the office.\par She does not like to take pain medications\par She uses a therma care to the right shoulder\par \par She will attempt to see her orthopedist next Monday.

## 2023-03-14 NOTE — ASSESSMENT
[FreeTextEntry1] : rpt a1c\par discussed diet with pt x 10 min\par Total time 20 minutes, FTF 15 min and chart review/documentation 5 min.\par

## 2023-03-14 NOTE — REVIEW OF SYSTEMS
[Joint Pain] : joint pain [Joint Stiffness] : joint stiffness [Muscle Pain] : muscle pain [Fever] : no fever [Eye Pain] : no eye pain [Earache] : no earache [Shortness Of Breath] : no shortness of breath [Chest Pain] : no chest pain [Abdominal Pain] : no abdominal pain [Dysuria] : no dysuria [Skin Wound] : no skin wound [Dizziness] : no dizziness [Easy Bruising] : no tendency for easy bruising [Insomnia] : no insomnia

## 2023-03-14 NOTE — HISTORY OF PRESENT ILLNESS
[FreeTextEntry1] : rpt a1c fever/cough [de-identified] : a1c 5.8\par Otherwise feels good. Appet, sleep, bms, voiding, mood all ok. no pain.\par

## 2023-03-14 NOTE — REVIEW OF SYSTEMS
[Joint Pain] : joint pain [Joint Stiffness] : joint stiffness [Muscle Pain] : muscle pain [Fever] : no fever [Eye Pain] : no eye pain [Earache] : no earache [Chest Pain] : no chest pain [Shortness Of Breath] : no shortness of breath [Abdominal Pain] : no abdominal pain [Dysuria] : no dysuria [Skin Wound] : no skin wound [Dizziness] : no dizziness [Easy Bruising] : no tendency for easy bruising [Insomnia] : no insomnia

## 2023-03-14 NOTE — HISTORY OF PRESENT ILLNESS
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 \par \par She was in the process of pulling a full heavy rack of returned clothes. The wheels on the rack became stuck in the back would not move. She pulled in the rack to get it to move. She had a sharp pain in the right shoulder and neck.\par She tried to treat with rest, Advil/Tylenol and avoiding tasks that would aggravate right neck and shoulder\par She has persistent pain and presents for an evaluation\par \par She attended restorative physical therapy for  2 x week for sessions for 8 session 22 sessions .  She has not been allowed any more therapy sessions since her last office visit..  \par \par She has increased pain in her right shoulder.  She continues to work at full work capacity at a [employer] which involves repetitive lifting\par \par She is pending decision on her right shoulder MRI.She continues to have pain in her right shoulder.  She has difficulty with lifting objects with the right hand.\par \par Pain:  9/10 Worse: 10/10 Quality: sharp  Frequency: constant\par Therapy has worked to increase the range of motion of her shoulder.  She still has limitation at end range both in forward flexion/abduction and internal rotation\par the pain starts in the front of the front of the shoulder radiates down to the arm to the elbow and to the neck\par The pain is aggravated with use of her arm away from the body.  She cannot manipulate objects on a shelf due to shoulder pain\par \par She previously had a Worker's Comp. injury on November 7, 2021 along the upper back and right shoulder.  She required restorative physical therapy.  She stopped therapy in March 2022.  She returned to work at full work capacity without further need of medical care\par \par She has no previous injuries or Worker's Comp. cases to her neck\par \par Functional deficits: She can perform ADLs but slowly.  She has increased pain with extremity dress and use of the right arm for ADLs such as brushing her teeth and combing her hair pushing off arms and \par \par She did not take time off from work.\par She drove to the office.\par She does not like to take pain medications\par She uses a therma care to the right shoulder\par \par She will attempt to see her orthopedist next Monday.

## 2023-03-19 LAB
APPEARANCE: CLEAR
BACTERIA UR CULT: NORMAL
BACTERIA: NEGATIVE
BILIRUBIN URINE: NEGATIVE
BLOOD URINE: NEGATIVE
COLOR: COLORLESS
ESTIMATED AVERAGE GLUCOSE: 117 MG/DL
GLUCOSE QUALITATIVE U: NEGATIVE
HBA1C MFR BLD HPLC: 5.7 %
HYALINE CASTS: 0 /LPF
KETONES URINE: NEGATIVE
LEUKOCYTE ESTERASE URINE: NEGATIVE
MICROSCOPIC-UA: NORMAL
NITRITE URINE: NEGATIVE
PH URINE: 6
PROTEIN URINE: NEGATIVE
RED BLOOD CELLS URINE: 1 /HPF
SPECIFIC GRAVITY URINE: 1.01
SQUAMOUS EPITHELIAL CELLS: 0 /HPF
UROBILINOGEN URINE: NORMAL
WHITE BLOOD CELLS URINE: 1 /HPF

## 2023-03-20 ENCOUNTER — NON-APPOINTMENT (OUTPATIENT)
Age: 68
End: 2023-03-20

## 2023-04-10 ENCOUNTER — TRANSCRIPTION ENCOUNTER (OUTPATIENT)
Age: 68
End: 2023-04-10

## 2023-04-10 ENCOUNTER — APPOINTMENT (OUTPATIENT)
Dept: PHYSICAL MEDICINE AND REHAB | Facility: CLINIC | Age: 68
End: 2023-04-10
Payer: MEDICARE

## 2023-04-10 VITALS
TEMPERATURE: 97.1 F | RESPIRATION RATE: 17 BRPM | HEIGHT: 62 IN | DIASTOLIC BLOOD PRESSURE: 78 MMHG | OXYGEN SATURATION: 100 % | HEART RATE: 74 BPM | SYSTOLIC BLOOD PRESSURE: 136 MMHG | BODY MASS INDEX: 18.4 KG/M2 | WEIGHT: 100 LBS

## 2023-04-10 DIAGNOSIS — Z02.6 ENCOUNTER FOR EXAMINATION FOR INSURANCE PURPOSES: ICD-10-CM

## 2023-04-10 PROCEDURE — 99213 OFFICE O/P EST LOW 20 MIN: CPT

## 2023-04-12 PROBLEM — Z02.6 ENCOUNTER FOR EXAMINATION FOR INSURANCE PURPOSES: Status: ACTIVE | Noted: 2023-03-14

## 2023-04-12 NOTE — PHYSICAL EXAM
[FreeTextEntry1] : Pleasant, in no distress. Language: English\par HEENT: Head: no trauma. Eyes: no discharge. Ears: No discharge. Nose No discharge. Throat: clear\par Neck: FAROM. Negative Spurlings\par Heart: RR, +S1, S2\par Lungs: CTA\par Abdomen: soft, NT\par Thoracic spine.  She has tenderness on palpation of the paraspinal musclesover to the lateral chest wall\par Lumbar spine: FAROM, no spasm\par \par LUE: Shoulder: FAROM, MS 5/5\par Elbow: FAROM, MS 5/5 reflexes 2/4\par Wrist: FAROM, MS 5/5 reflexes 2/4\par Warm, nontender, pulse 2+\par \par RUE:Shoulder:AROM FF/ABD  0-150.  IR 0 TO 10 DEGREES MS 4+/5 tender to palpation to of the left lateral shoulder and over the shoulder blade.  No ecchymosis. tender with spasm in the interscapular muscles\par Elbow: FAROM, MS 5/5 reflexes 2/4\par Wrist: FAROM, MS 5/5 reflexes 2/4\par Warm, nontender, pulse 2+\par \par LLE: Hip: FAROM, MS 5/5\par Knee: FAROM, MS 4+/5 tender to touch.  No instability.  mild Tender over the medial knee.  Negative Dimas's.  Resolving ecchymosis.  Reflexes 2/4\par Ankle: FAROM, MS 5/5 reflexes 2/4\par Warm , nontender, pulse 2+ negative homans\par \par RLE: Hip: FAROM, MS 5/5\par Knee: FAROM, MS 5/5 reflexes 2/4\par Ankle: FAROM, MS 5/5 reflexes 2/4\par Warm , nontender, pulse 2+ negative homans\par \par Gait: Spontaneous, reciprocal, safe without an assistive device\par \par Sensation\par RUE: sensation is intact to light touch, pinprick  and proprioception\par LUE: sensation is intact to light touch, pinprick  and proprioception\par RLE: sensation is intact to light touch, pinprick  and proprioception. Neg SLR. Neg JOSE RAMON, Neg FADIR\par LLE: sensation is intact to light touch, pinprick  and proprioception. Neg SLR. Neg JOSE RAMON, Neg FADIR\par \par

## 2023-04-12 NOTE — HISTORY OF PRESENT ILLNESS
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 \par \par She was in the process of pulling a full heavy rack of returned clothes. The wheels on the rack became stuck in the back would not move. She pulled in the rack to get it to move. She had a sharp pain in the right shoulder and neck.\par She tried to treat with rest, Advil/Tylenol and avoiding tasks that would aggravate right neck and shoulder\par She has persistent pain and presents for an evaluation\par \par She attended restorative physical therapy for  2 x week for sessions for 8 session 22 sessions .  She has not been allowed any more therapy sessions since her last office visit..  \par She saw orthopedist  Dr. Lalo Casanova 3.20.2023. he recommends an MRI of the right shoulder.\par The MRI was denied by . \par \par She has increased pain in her right shoulder.  She continues to work at full work capacity at a [employer] which involves repetitive lifting\par \par She is pending decision on her right shoulder MRI. It is in Level 3. There is no mechanism to add additional information to the initial PAR request. \par She continues to have pain in her right shoulder.  She has difficulty with lifting objects with the right hand.\par \par Pain:  9/10 Worse: 10/10 Quality: sharp  Frequency: constant\par Therapy has worked to increase the range of motion of her shoulder.  She still has limitation at end range both in forward flexion/abduction and internal rotation\par the pain starts in the front of the front of the shoulder radiates down to the arm to the elbow and to the neck\par The pain is aggravated with use of her arm away from the body.  She cannot manipulate objects on a shelf due to shoulder pain.  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 \par \par She previously had a Worker's Comp. injury on November 7, 2021 along the upper back and right shoulder.  She required restorative physical therapy.  She stopped therapy in March 2022.  She returned to work at full work capacity without further need of medical care\par \par She has no previous injuries or Worker's Comp. cases to her neck\par \par Functional deficits: She can perform ADLs but slowly.  She has increased pain with extremity dress and use of the right arm for ADLs such as brushing her teeth and combing her hair pushing off arms and \par \par She did not take time off from work.\par She drove to the office.\par She does not like to take pain medications\par She continues to use topical medications/creams and ice or moist heat packs to the right shoulder for pain relief\par \par She will attempt to see her orthopedist next Monday.

## 2023-04-25 ENCOUNTER — NON-APPOINTMENT (OUTPATIENT)
Age: 68
End: 2023-04-25

## 2023-05-08 ENCOUNTER — APPOINTMENT (OUTPATIENT)
Dept: PHYSICAL MEDICINE AND REHAB | Facility: CLINIC | Age: 68
End: 2023-05-08
Payer: MEDICARE

## 2023-05-08 VITALS
SYSTOLIC BLOOD PRESSURE: 114 MMHG | OXYGEN SATURATION: 98 % | TEMPERATURE: 97.2 F | WEIGHT: 100 LBS | HEART RATE: 74 BPM | BODY MASS INDEX: 18.29 KG/M2 | RESPIRATION RATE: 17 BRPM | DIASTOLIC BLOOD PRESSURE: 70 MMHG

## 2023-05-08 PROCEDURE — 99213 OFFICE O/P EST LOW 20 MIN: CPT

## 2023-05-11 NOTE — HISTORY OF PRESENT ILLNESS
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 \par \par She was in the process of pulling a full heavy rack of returned clothes. The wheels on the rack became stuck in the back would not move. She pulled in the rack to get it to move. She had a sharp pain in the right shoulder and neck.\par She tried to treat with rest, Advil/Tylenol and avoiding tasks that would aggravate right neck and shoulder\par She has persistent pain and presents for an evaluation\par \par She attended restorative physical therapy for 22 sessions .  She has not been allowed any more therapy sessions since her last office visit..  Her shoulder pain has increased.  She performs a simple range of motion exercise program as feasible to maintain the range of motion as best as possible.\par \par She saw orthopedist  Dr. Lalo Casanova 3.20.2023. he recommends an MRI of the right shoulder.\par The MRI of the right shoulder has been found to be improved by Worker's Compensation.  The patient is trying to schedule an appointment to get into Dr. Ferraro office to perform the MRI\par \par She has increased pain in her right shoulder.  She continued to work at full work capacity at a [employer] which involves repetitive lifting.   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  She has difficulty with lifting objects with the right hand.\par \par Pain:  9+/10 Worse: 10/10 Quality: sharp  Frequency: constant\par Therapy has worked to increase the range of motion of her shoulder.  She still has limitation at end range both in forward flexion/abduction and internal rotation\par The pain starts in the front of the front of the shoulder radiates down to the arm to the elbow and to the neck\par The pain is aggravated with use of her arm away from the body.  She cannot manipulate objects on a shelf due to shoulder pain. \par \par The right shoulder pain also interferes with ADLs. \par \par She previously had a Worker's Comp. injury on November 7, 2021 along the upper back and right shoulder.  She required restorative physical therapy.  She stopped therapy in March 2022.  She returned to work at full work capacity without further need of medical care\par \par She has no previous injuries or Worker's Comp. cases to her neck\par \par Functional deficits: She can perform ADLs but slowly.  She has increased pain with extremity dress and use of the right arm for ADLs such as brushing her teeth and combing her hair pushing off arms and \par \par She did not take time off from work.\par She drove to the office.\par She does not like to take pain medications\par She continues to use topical medications/creams and ice or moist heat packs to the right shoulder for pain relief\par \par She will follow-up with her orthopedist next week.

## 2023-06-06 ENCOUNTER — APPOINTMENT (OUTPATIENT)
Dept: PHYSICAL MEDICINE AND REHAB | Facility: CLINIC | Age: 68
End: 2023-06-06
Payer: OTHER MISCELLANEOUS

## 2023-06-06 VITALS
OXYGEN SATURATION: 97 % | BODY MASS INDEX: 18.58 KG/M2 | HEART RATE: 67 BPM | TEMPERATURE: 96.6 F | HEIGHT: 62 IN | DIASTOLIC BLOOD PRESSURE: 62 MMHG | WEIGHT: 101 LBS | RESPIRATION RATE: 17 BRPM | SYSTOLIC BLOOD PRESSURE: 114 MMHG

## 2023-06-06 PROCEDURE — 99214 OFFICE O/P EST MOD 30 MIN: CPT

## 2023-06-07 NOTE — PHYSICAL EXAM
Pt. Endorsed to [facility] in stable condition. Admitted with SROM. [FreeTextEntry1] : Pleasant, in no distress. Language: English\par HEENT: Head: no trauma. Eyes: no discharge. Ears: No discharge. Nose No discharge. Throat: clear\par Neck: FAROM. Negative Spurlings\par Heart: RR, +S1, S2\par Lungs: CTA\par Abdomen: soft, NT\par Thoracic spine.  She has tenderness on palpation of the paraspinal musclesover to the lateral chest wall\par Lumbar spine: FAROM, no spasm\par \par LUE: Shoulder: FAROM, MS 5/5\par Elbow: FAROM, MS 5/5 reflexes 2/4\par Wrist: FAROM, MS 5/5 reflexes 2/4\par Warm, nontender, pulse 2+\par \par RUE:Shoulder:AROM FF/ABD  0-100.  IR 0 TO 10 DEGREES MS 3+/5 tender to palpation to of the left lateral shoulder and over the shoulder blade.  No ecchymosis. tender with spasm in the interscapular muscles\par Elbow: FAROM, MS 5/5 reflexes 2/4\par Wrist: FAROM, MS 5/5 reflexes 2/4\par Warm, nontender, pulse 2+\par \par LLE: Hip: FAROM, MS 5/5\par Knee: FAROM, MS 4+/5 tender to touch.  No instability.  mild Tender over the medial knee.  Negative Dimas's.  Resolving ecchymosis.  Reflexes 2/4\par Ankle: FAROM, MS 5/5 reflexes 2/4\par Warm , nontender, pulse 2+ negative homans\par \par RLE: Hip: FAROM, MS 5/5\par Knee: FAROM, MS 5/5 reflexes 2/4\par Ankle: FAROM, MS 5/5 reflexes 2/4\par Warm , nontender, pulse 2+ negative homans\par \par Gait: Spontaneous, reciprocal, safe without an assistive device\par \par Sensation\par RUE: sensation is intact to light touch, pinprick  and proprioception\par LUE: sensation is intact to light touch, pinprick  and proprioception\par RLE: sensation is intact to light touch, pinprick  and proprioception. Neg SLR. Neg JOSE RAMON, Neg FADIR\par LLE: sensation is intact to light touch, pinprick  and proprioception. Neg SLR. Neg JOSE RAMON, Neg FADIR\par \par

## 2023-06-07 NOTE — DATA REVIEWED
[FreeTextEntry1] : Rib x-rays performed on 11/24/2021 reveals no fracture\par \par X-rays of the thoracic spine performed on 11/24/2021 reveals mild left adult scoliosis\par \par X-ray of the right shoulder performed on November 17, 2022 reveals mild calcific bursitis\par \par MRI of the right shoulder performed on May 11, 2023 reveals\par Mild to moderate supraspinatus tendinosis with a small focus of calcific tendinitis at the posterior aspect of the insertion.  There is fraying of the articular margin.  There is no discrete tear.  There is mild overlying subacromial/subdeltoid bursitis.\par

## 2023-06-07 NOTE — HISTORY OF PRESENT ILLNESS
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 \par \par She was in the process of pulling a full heavy rack of returned clothes. The wheels on the rack became stuck in the back would not move. She pulled in the rack to get it to move. She had a sharp pain in the right shoulder and neck.\par She tried to treat with rest, Advil/Tylenol and avoiding tasks that would aggravate right neck and shoulder\par She has persistent pain and presents for an evaluation\par \par She attended restorative physical therapy for 22 sessions .  She has not been allowed any more therapy sessions since her last office visit..  Her shoulder pain has increased.  She has decreased range of motion.  She performs a simple range of motion exercise program as feasible to maintain the range of motion as best as possible.\par \par She has changed how her habits.  She no longer carries a purse over the right shoulder.  She uses a left arm more than the right.  She does not reach above the shoulder level with the right arm due to pain\par \par She saw orthopedist  Dr. Lalo Casanova 3.20.2023. he recommends an MRI of the right shoulder.\par The patient will follow-up with her orthopedist in the [facility], Dr. Ferraro to discuss further interventions if necessary to the right shoulder to assist with her persistent shoulder pain\par \par She has increased pain in her right shoulder.  She continued to work at full work capacity at a [employer] which involves repetitive lifting.   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 Worse: 10/10 Quality: sharp Frequency: constant\par Therapy has worked to increase the range of motion of her shoulder. She still has limitation at end range both in forward flexion/abduction and internal rotation\par The pain starts in the front of the front of the shoulder radiates down to the arm to the elbow and to the neck\par The pain is aggravated with use of her arm away from the body. She cannot manipulate objects on a shelf due to shoulder pain. \par \par She previously had a Worker's Comp. injury on November 7, 2021 along the upper back and right shoulder.  She required restorative physical therapy.  She stopped therapy in March 2022.  She returned to work at full work capacity without further need of medical care\par \par She has no previous injuries or Worker's Comp. cases to her neck\par \par Functional deficits: She can perform ADLs but slowly.  She has increased pain with extremity dress and use of the right arm for ADLs such as brushing her teeth and combing her hair pushing off arms and \par \par She did not take time off from work.\par She drove to the office.\par She does not like to take pain medications\par She continues to use topical medications/creams and ice or moist heat packs to the right shoulder for pain relief\par \par She will follow-up with her orthopedist next week.

## 2023-07-17 ENCOUNTER — APPOINTMENT (OUTPATIENT)
Dept: INTERNAL MEDICINE | Facility: CLINIC | Age: 68
End: 2023-07-17
Payer: MEDICARE

## 2023-07-17 VITALS
BODY MASS INDEX: 18.4 KG/M2 | OXYGEN SATURATION: 96 % | HEIGHT: 62 IN | DIASTOLIC BLOOD PRESSURE: 72 MMHG | RESPIRATION RATE: 17 BRPM | HEART RATE: 69 BPM | SYSTOLIC BLOOD PRESSURE: 114 MMHG | WEIGHT: 100 LBS | TEMPERATURE: 98.7 F

## 2023-07-17 DIAGNOSIS — J45.20 MILD INTERMITTENT ASTHMA, UNCOMPLICATED: ICD-10-CM

## 2023-07-17 PROCEDURE — 36415 COLL VENOUS BLD VENIPUNCTURE: CPT

## 2023-07-17 PROCEDURE — 99213 OFFICE O/P EST LOW 20 MIN: CPT | Mod: 25

## 2023-07-17 RX ORDER — ALBUTEROL SULFATE 90 UG/1
108 (90 BASE) INHALANT RESPIRATORY (INHALATION)
Qty: 1 | Refills: 3 | Status: ACTIVE | COMMUNITY
Start: 2021-08-04 | End: 1900-01-01

## 2023-07-17 NOTE — HISTORY OF PRESENT ILLNESS
[FreeTextEntry1] : The patient is here for a follow up visit to review their medications and chronic medical conditions.\par asthma [de-identified] : mild interm asthma- using mdi 2x/wk. at most. \par a1c 5.7\par Otherwise feels good. Appet, sleep, bms, voiding, mood all ok. no pain.\par Reviewed all interim as well as relevant prior consultations, labs and radiological studies.\par

## 2023-07-17 NOTE — ASSESSMENT
[FreeTextEntry1] : Mild intermit asthma- alb prn. currently only using it up to biw. \par a1c 5.8, 5.7, rpt. healthy diet.exc. mediterranean diet. will be seeing nutritionist\par Pt. was encouraged to do all relevant screening tests including but not limited to mammogram, gyn visit, colonoscopy, bone density, annual skin exam.\par Total time 20 minutes, FTF 15 min and chart review/documentation 5 min.\par

## 2023-07-19 ENCOUNTER — APPOINTMENT (OUTPATIENT)
Dept: INTERNAL MEDICINE | Facility: CLINIC | Age: 68
End: 2023-07-19
Payer: MEDICARE

## 2023-07-19 VITALS
TEMPERATURE: 97.6 F | HEART RATE: 68 BPM | DIASTOLIC BLOOD PRESSURE: 76 MMHG | HEIGHT: 62 IN | BODY MASS INDEX: 20.24 KG/M2 | WEIGHT: 110 LBS | RESPIRATION RATE: 17 BRPM | OXYGEN SATURATION: 99 % | SYSTOLIC BLOOD PRESSURE: 116 MMHG

## 2023-07-19 DIAGNOSIS — R73.09 OTHER ABNORMAL GLUCOSE: ICD-10-CM

## 2023-07-19 DIAGNOSIS — J45.909 UNSPECIFIED ASTHMA, UNCOMPLICATED: ICD-10-CM

## 2023-07-19 PROCEDURE — ZZZZZ: CPT

## 2023-07-19 PROCEDURE — 99212 OFFICE O/P EST SF 10 MIN: CPT

## 2023-07-19 NOTE — COUNSELING
[Fall prevention counseling provided] : Fall prevention counseling provided [Adequate lighting] : Adequate lighting [No throw rugs] : No throw rugs [Use proper foot wear] : Use proper foot wear [Use recommended devices] : Use recommended devices [Behavioral health counseling provided] : Behavioral health counseling provided [Sleep ___ hours/day] : Sleep [unfilled] hours/day [Engage in a relaxing activity] : Engage in a relaxing activity [Plan in advance] : Plan in advance [None] : None [Good understanding] : Patient has a good understanding of lifestyle changes and steps needed to achieve self management goal [de-identified] : Total face-to-face time with patient - 15 minutes; >50% involved counselling, review of labs/tests, and/or coordination of medical care:\par - Discussed diabetes physiology\par - Discussed importance of monitoring blood glucose levels\par - Encouraged a low fat/low cholesterol diet\par - Discussed symptoms of hyperglycemia and hypoglycemia\par - Discussed ADA glucose goals\par - Discussed  HGB A1c and the effects of blood glucose on the level\par - Discussed Healthy eating, avoidance of concentrated sweets, and to include vegetables by at least 2 meals a day\par - Discussed regular exercise\par - Discussed importance of follow up physician visits\par advised  Vitamin D 4,000 iu qd after high dose completed;  Discussed nutritional information, ie.Too little vitamin D results in fragile, misshapen bones in adults.Vitamin D is also necessary for other important body functions.Vitamin D deficiency has now been linked to breast cancer, colon cancer, prostate cancer, heart disease, depression, weight gain, and other maladies.These studies show that people with higher levels of vitamin D have a lower risk of disease\par \par low chol diet. Avoid fried foods, red meat, butter, eggs, hard cheeses. Use canola or olive oil preferred.\par \par  - Encouraged a low fat/low cholesterol diet\par  - Discussed Healthy eating, avoidance of concentrated sweets, and to include vegetables by at least 3 meals a day\par  - encouraged low glycemic fruits, grains and vegetables and a diet high in plant protein\par  - Discussed regular exercise\par  - Discussed importance of follow up physician visits\par diet and exercise weight loss.  Low-salt low-fat ADA diet/ htn- Discussed diabetes physiology\par - Discussed importance of monitoring blood glucose levels\par - Encouraged a low fat/low cholesterol diet\par - Discussed symptoms of hyperglycemia and hypoglycemia\par - Discussed ADA glucose goals\par - Discussed  HGB A1c and the effects of blood glucose on the level\par - Discussed Healthy eating, avoidance of concentrated sweets, and to include vegetables by at least 2 meals a day\par - Discussed regular exercise\par - Discussed importance of follow up physician visits Limit intake of Sodium (Salt) to less than 2 grams a day to prevent fluid retention-swelling or worsening of symptoms. The importance of keeping the blood pressure at or below 130/80 to prevent stroke, heart attacks, kidney failure, blindness, and loss of limbs was  low chol diet. Avoid fried foods, red meat, butter, eggs, hard cheeses. Use canola or olive oil preferred. ::  was established in which goals would be set, monitoring would be done, and problem solving would also be addressed. The patient would be assisted using behavior change techniques, such as self-help and counseling through behavioral modification: Problem solving using hypnosis and positive medical reinforcement to achieve agreed-upon goals.\par \par

## 2023-07-19 NOTE — END OF VISIT
[FreeTextEntry4] : I Tahir Skaggs, am scribing for and in the presence of Dr. Noel, the following sections of:  HISTORY OF PRESENT ILLNESS, PAST MEDICAL/FAMILY/SOCIAL HISTORY, ROS, VITALS, PE, DISPOSITION

## 2023-07-19 NOTE — HEALTH RISK ASSESSMENT
[No falls in past year] : Patient reported no falls in the past year [0] : 2) Feeling down, depressed, or hopeless: Not at all (0) [PHQ-2 Negative - No further assessment needed] : PHQ-2 Negative - No further assessment needed [Reviewed no changes] : Reviewed, no changes [Never] : Never [de-identified] : Active [de-identified] : Standard

## 2023-07-19 NOTE — PHYSICAL EXAM
[No Acute Distress] : no acute distress [Well Nourished] : well nourished [Well Developed] : well developed [Well-Appearing] : well-appearing [Normal Sclera/Conjunctiva] : normal sclera/conjunctiva [PERRL] : pupils equal round and reactive to light [EOMI] : extraocular movements intact [Normal Outer Ear/Nose] : the outer ears and nose were normal in appearance [Normal Oropharynx] : the oropharynx was normal [No JVD] : no jugular venous distention [No Lymphadenopathy] : no lymphadenopathy [Supple] : supple [Thyroid Normal, No Nodules] : the thyroid was normal and there were no nodules present [No Respiratory Distress] : no respiratory distress  [No Accessory Muscle Use] : no accessory muscle use [Clear to Auscultation] : lungs were clear to auscultation bilaterally [Normal Rate] : normal rate  [Regular Rhythm] : with a regular rhythm [Normal S1, S2] : normal S1 and S2 [No Murmur] : no murmur heard [No Carotid Bruits] : no carotid bruits [No Abdominal Bruit] : a ~M bruit was not heard ~T in the abdomen [No Varicosities] : no varicosities [Pedal Pulses Present] : the pedal pulses are present [No Edema] : there was no peripheral edema [No Palpable Aorta] : no palpable aorta [No Extremity Clubbing/Cyanosis] : no extremity clubbing/cyanosis [Soft] : abdomen soft [Non Tender] : non-tender [Non-distended] : non-distended [No Masses] : no abdominal mass palpated [No HSM] : no HSM [Normal Bowel Sounds] : normal bowel sounds [Normal Posterior Cervical Nodes] : no posterior cervical lymphadenopathy [Normal Anterior Cervical Nodes] : no anterior cervical lymphadenopathy [No CVA Tenderness] : no CVA  tenderness [No Spinal Tenderness] : no spinal tenderness [No Joint Swelling] : no joint swelling [Grossly Normal Strength/Tone] : grossly normal strength/tone [No Rash] : no rash [Coordination Grossly Intact] : coordination grossly intact [No Focal Deficits] : no focal deficits [Normal Gait] : normal gait [Deep Tendon Reflexes (DTR)] : deep tendon reflexes were 2+ and symmetric [Normal Affect] : the affect was normal [Normal Insight/Judgement] : insight and judgment were intact [Alert and Oriented x3] : oriented to person, place, and time [Normal] : affect was normal and insight and judgment were intact [de-identified] : MI 20

## 2023-07-19 NOTE — COUNSELING
[Fall prevention counseling provided] : Fall prevention counseling provided [Adequate lighting] : Adequate lighting [No throw rugs] : No throw rugs [Use proper foot wear] : Use proper foot wear [Use recommended devices] : Use recommended devices [Behavioral health counseling provided] : Behavioral health counseling provided [Sleep ___ hours/day] : Sleep [unfilled] hours/day [Engage in a relaxing activity] : Engage in a relaxing activity [Plan in advance] : Plan in advance [None] : None [Good understanding] : Patient has a good understanding of lifestyle changes and steps needed to achieve self management goal [de-identified] : Total face-to-face time with patient - 15 minutes; >50% involved counselling, review of labs/tests, and/or coordination of medical care:\par - Discussed diabetes physiology\par - Discussed importance of monitoring blood glucose levels\par - Encouraged a low fat/low cholesterol diet\par - Discussed symptoms of hyperglycemia and hypoglycemia\par - Discussed ADA glucose goals\par - Discussed  HGB A1c and the effects of blood glucose on the level\par - Discussed Healthy eating, avoidance of concentrated sweets, and to include vegetables by at least 2 meals a day\par - Discussed regular exercise\par - Discussed importance of follow up physician visits\par advised  Vitamin D 4,000 iu qd after high dose completed;  Discussed nutritional information, ie.Too little vitamin D results in fragile, misshapen bones in adults.Vitamin D is also necessary for other important body functions.Vitamin D deficiency has now been linked to breast cancer, colon cancer, prostate cancer, heart disease, depression, weight gain, and other maladies.These studies show that people with higher levels of vitamin D have a lower risk of disease\par \par low chol diet. Avoid fried foods, red meat, butter, eggs, hard cheeses. Use canola or olive oil preferred.\par \par  - Encouraged a low fat/low cholesterol diet\par  - Discussed Healthy eating, avoidance of concentrated sweets, and to include vegetables by at least 3 meals a day\par  - encouraged low glycemic fruits, grains and vegetables and a diet high in plant protein\par  - Discussed regular exercise\par  - Discussed importance of follow up physician visits\par diet and exercise weight loss.  Low-salt low-fat ADA diet/ htn- Discussed diabetes physiology\par - Discussed importance of monitoring blood glucose levels\par - Encouraged a low fat/low cholesterol diet\par - Discussed symptoms of hyperglycemia and hypoglycemia\par - Discussed ADA glucose goals\par - Discussed  HGB A1c and the effects of blood glucose on the level\par - Discussed Healthy eating, avoidance of concentrated sweets, and to include vegetables by at least 2 meals a day\par - Discussed regular exercise\par - Discussed importance of follow up physician visits Limit intake of Sodium (Salt) to less than 2 grams a day to prevent fluid retention-swelling or worsening of symptoms. The importance of keeping the blood pressure at or below 130/80 to prevent stroke, heart attacks, kidney failure, blindness, and loss of limbs was  low chol diet. Avoid fried foods, red meat, butter, eggs, hard cheeses. Use canola or olive oil preferred. ::  was established in which goals would be set, monitoring would be done, and problem solving would also be addressed. The patient would be assisted using behavior change techniques, such as self-help and counseling through behavioral modification: Problem solving using hypnosis and positive medical reinforcement to achieve agreed-upon goals.\par \par

## 2023-07-19 NOTE — PHYSICAL EXAM
[No Acute Distress] : no acute distress [Well Nourished] : well nourished [Well Developed] : well developed [Well-Appearing] : well-appearing [Normal Sclera/Conjunctiva] : normal sclera/conjunctiva [PERRL] : pupils equal round and reactive to light [EOMI] : extraocular movements intact [Normal Outer Ear/Nose] : the outer ears and nose were normal in appearance [Normal Oropharynx] : the oropharynx was normal [No JVD] : no jugular venous distention [No Lymphadenopathy] : no lymphadenopathy [Supple] : supple [Thyroid Normal, No Nodules] : the thyroid was normal and there were no nodules present [No Respiratory Distress] : no respiratory distress  [No Accessory Muscle Use] : no accessory muscle use [Clear to Auscultation] : lungs were clear to auscultation bilaterally [Normal Rate] : normal rate  [Regular Rhythm] : with a regular rhythm [Normal S1, S2] : normal S1 and S2 [No Murmur] : no murmur heard [No Carotid Bruits] : no carotid bruits [No Abdominal Bruit] : a ~M bruit was not heard ~T in the abdomen [No Varicosities] : no varicosities [Pedal Pulses Present] : the pedal pulses are present [No Edema] : there was no peripheral edema [No Palpable Aorta] : no palpable aorta [No Extremity Clubbing/Cyanosis] : no extremity clubbing/cyanosis [Soft] : abdomen soft [Non Tender] : non-tender [Non-distended] : non-distended [No Masses] : no abdominal mass palpated [No HSM] : no HSM [Normal Bowel Sounds] : normal bowel sounds [Normal Posterior Cervical Nodes] : no posterior cervical lymphadenopathy [Normal Anterior Cervical Nodes] : no anterior cervical lymphadenopathy [No CVA Tenderness] : no CVA  tenderness [No Spinal Tenderness] : no spinal tenderness [No Joint Swelling] : no joint swelling [Grossly Normal Strength/Tone] : grossly normal strength/tone [No Rash] : no rash [Coordination Grossly Intact] : coordination grossly intact [No Focal Deficits] : no focal deficits [Normal Gait] : normal gait [Deep Tendon Reflexes (DTR)] : deep tendon reflexes were 2+ and symmetric [Normal Affect] : the affect was normal [Normal Insight/Judgement] : insight and judgment were intact [Alert and Oriented x3] : oriented to person, place, and time [Normal] : affect was normal and insight and judgment were intact [de-identified] : MI 20

## 2023-07-19 NOTE — HISTORY OF PRESENT ILLNESS
[FreeTextEntry1] : Follow up/patient here for nutritional evaluation and counseling.  See nutritionist.  Found to have an elevated A1c of five-point, strolls to 10 with an LDL of 90.  So has a low vitamin D level of 23.6 [de-identified] : 68 y/o F presents for a follow up, ADINA\par Pt states she is concerned for her A1C, the most recent being 5.8, she states she has been adjusting her diet, eliminating simple carbs.\par Hx of asthma, uses rescue inhaler PRN.\par She states she is otherwise well, and voices no further complaints.\par Would like to see nutritionist.\par

## 2023-07-19 NOTE — HISTORY OF PRESENT ILLNESS
[FreeTextEntry1] : Follow up/patient here for nutritional evaluation and counseling.  See nutritionist.  Found to have an elevated A1c of five-point, strolls to 10 with an LDL of 90.  So has a low vitamin D level of 23.6 [de-identified] : 66 y/o F presents for a follow up, ADINA\par Pt states she is concerned for her A1C, the most recent being 5.8, she states she has been adjusting her diet, eliminating simple carbs.\par Hx of asthma, uses rescue inhaler PRN.\par She states she is otherwise well, and voices no further complaints.\par Would like to see nutritionist.\par

## 2023-07-19 NOTE — END OF VISIT
[FreeTextEntry4] : I Tahri Skaggs, am scribing for and in the presence of Dr. Noel, the following sections of:  HISTORY OF PRESENT ILLNESS, PAST MEDICAL/FAMILY/SOCIAL HISTORY, ROS, VITALS, PE, DISPOSITION

## 2023-07-19 NOTE — HEALTH RISK ASSESSMENT
[No falls in past year] : Patient reported no falls in the past year [0] : 2) Feeling down, depressed, or hopeless: Not at all (0) [PHQ-2 Negative - No further assessment needed] : PHQ-2 Negative - No further assessment needed [Reviewed no changes] : Reviewed, no changes [Never] : Never [de-identified] : Active [de-identified] : Standard

## 2023-07-21 LAB
25(OH)D3 SERPL-MCNC: 23.6 NG/ML
CHOLEST SERPL-MCNC: 210 MG/DL
ESTIMATED AVERAGE GLUCOSE: 120 MG/DL
HBA1C MFR BLD HPLC: 5.8 %
HDLC SERPL-MCNC: 103 MG/DL
LDLC SERPL CALC-MCNC: 96 MG/DL
NONHDLC SERPL-MCNC: 107 MG/DL
TRIGL SERPL-MCNC: 59 MG/DL

## 2023-07-24 ENCOUNTER — NON-APPOINTMENT (OUTPATIENT)
Age: 68
End: 2023-07-24

## 2023-07-25 ENCOUNTER — NON-APPOINTMENT (OUTPATIENT)
Age: 68
End: 2023-07-25

## 2023-07-27 ENCOUNTER — APPOINTMENT (OUTPATIENT)
Dept: PHYSICAL MEDICINE AND REHAB | Facility: CLINIC | Age: 68
End: 2023-07-27
Payer: OTHER MISCELLANEOUS

## 2023-07-27 VITALS
DIASTOLIC BLOOD PRESSURE: 70 MMHG | SYSTOLIC BLOOD PRESSURE: 110 MMHG | BODY MASS INDEX: 19.05 KG/M2 | RESPIRATION RATE: 17 BRPM | HEIGHT: 62 IN | OXYGEN SATURATION: 98 % | TEMPERATURE: 97.1 F | HEART RATE: 69 BPM | WEIGHT: 103.5 LBS

## 2023-07-27 PROCEDURE — 99213 OFFICE O/P EST LOW 20 MIN: CPT

## 2023-07-28 NOTE — REVIEW OF SYSTEMS
[Joint Pain] : joint pain [Joint Stiffness] : joint stiffness [Muscle Pain] : muscle pain [Fever] : no fever [Eye Pain] : no eye pain [Earache] : no earache [Chest Pain] : no chest pain [Shortness Of Breath] : no shortness of breath [Abdominal Pain] : no abdominal pain [Skin Wound] : no skin wound [Dysuria] : no dysuria [Dizziness] : no dizziness [Insomnia] : no insomnia [Easy Bruising] : no tendency for easy bruising

## 2023-07-28 NOTE — HISTORY OF PRESENT ILLNESS
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 \par \par She was in the process of pulling a full heavy rack of returned clothes. The wheels on the rack became stuck in the back would not move. She pulled in the rack to get it to move. She had a sharp pain in the right shoulder and neck.\par She tried to treat with rest, Advil/Tylenol and avoiding tasks that would aggravate right neck and shoulder\par She has persistent pain and presents for an evaluation\par \par She attended restorative physical therapy for 22 sessions .  She has a new series of therapy for 12 sessions.  Therapy has improved the range of motion of her shoulder.  She still has weakness and pain at end range\par \par She has changed how her habits.  She no longer carries a purse over the right shoulder.  She uses a left arm more than the right.  She does not reach above the shoulder level with the right arm due to pain\par \par She saw orthopedist  Dr. Lalo Casanova 3.20.2023. \par The patient will follow-up with her orthopedist in the city, Dr. Ferraro to discuss further interventions if necessary to the right shoulder to assist with her persistent shoulder pain\par Dr. Casanova who reviewed the MRI of the right shoulder with the patient\par He performed an injection using right steroid shoulder last month he does not recommend surgery at this time.  He recommended to continue therapy\par \par She continued to work at full work capacity at a [employer] which involves repetitive lifting.   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 Worse: 10/10 Quality: sharp Frequency: constant\par Therapy has worked to increase the range of motion of her shoulder. She still has limitation at end range both in forward flexion/abduction and internal rotation\par The pain starts in the front of the front of the shoulder radiates down to the arm to the elbow and to the neck\par The pain is aggravated with use of her arm away from the body. She cannot manipulate objects on a shelf due to shoulder pain. \par \par She previously had a Worker's Comp. injury on November 7, 2021 along the upper back and right shoulder.  She required restorative physical therapy.  She stopped therapy in March 2022.  She returned to work at full work capacity without further need of medical care\par \par She has no previous injuries or Worker's Comp. cases to her neck\par \par Functional deficits: She can perform ADLs but slowly.  She has increased pain with extremity dress and use of the right arm for ADLs such as brushing her teeth and combing her hair pushing off arms and \par \par She did not take time off from work.\par She drove to the office.\par She does not like to take pain medications\par She continues to use topical medications/creams and ice or moist heat packs to the right shoulder for pain relief\par

## 2023-07-28 NOTE — PHYSICAL EXAM
[FreeTextEntry1] : Pleasant, in no distress. Language: English\par HEENT: Head: no trauma. Eyes: no discharge. Ears: No discharge. Nose No discharge. Throat: clear\par Neck: FAROM. Negative Spurlings\par Heart: RR, +S1, S2\par Lungs: CTA\par Abdomen: soft, NT\par Thoracic spine.  She has tenderness on palpation of the paraspinal musclesover to the lateral chest wall\par Lumbar spine: FAROM, no spasm\par \par LUE: Shoulder: FAROM, MS 5/5\par Elbow: FAROM, MS 5/5 reflexes 2/4\par Wrist: FAROM, MS 5/5 reflexes 2/4\par Warm, nontender, pulse 2+\par \par RUE:Shoulder:AROM FF/ABD  0-130.  IR 0 TO 20 DEGREES ER 0-40 MS 4/5 tender to palpation to of the left lateral shoulder and over the shoulder blade.  No ecchymosis. tender with spasm in the interscapular muscles\par Elbow: FAROM, MS 5/5 reflexes 2/4\par Wrist: FAROM, MS 5/5 reflexes 2/4\par Warm, nontender, pulse 2+\par \par LLE: Hip: FAROM, MS 5/5\par Knee: FAROM, MS 4+/5 tender to touch.  No instability.  mild Tender over the medial knee.  Negative Dimas's.  Resolving ecchymosis.  Reflexes 2/4\par Ankle: FAROM, MS 5/5 reflexes 2/4\par Warm , nontender, pulse 2+ negative homans\par \par RLE: Hip: FAROM, MS 5/5\par Knee: FAROM, MS 5/5 reflexes 2/4\par Ankle: FAROM, MS 5/5 reflexes 2/4\par Warm , nontender, pulse 2+ negative homans\par \par Gait: Spontaneous, reciprocal, safe without an assistive device\par \par Sensation\par RUE: sensation is intact to light touch, pinprick  and proprioception\par LUE: sensation is intact to light touch, pinprick  and proprioception\par RLE: sensation is intact to light touch, pinprick  and proprioception. Neg SLR. Neg JOSE RAMON, Neg FADIR\par LLE: sensation is intact to light touch, pinprick  and proprioception. Neg SLR. Neg JOSE RAMON, Neg FADIR\par \par

## 2023-08-16 ENCOUNTER — APPOINTMENT (OUTPATIENT)
Dept: INTERNAL MEDICINE | Facility: CLINIC | Age: 68
End: 2023-08-16
Payer: MEDICARE

## 2023-08-16 VITALS
SYSTOLIC BLOOD PRESSURE: 114 MMHG | DIASTOLIC BLOOD PRESSURE: 64 MMHG | RESPIRATION RATE: 17 BRPM | HEART RATE: 61 BPM | TEMPERATURE: 96.8 F | WEIGHT: 100 LBS | OXYGEN SATURATION: 98 % | BODY MASS INDEX: 18.4 KG/M2 | HEIGHT: 62 IN

## 2023-08-16 DIAGNOSIS — G89.29 DORSALGIA, UNSPECIFIED: ICD-10-CM

## 2023-08-16 DIAGNOSIS — Z71.3 DIETARY COUNSELING AND SURVEILLANCE: ICD-10-CM

## 2023-08-16 DIAGNOSIS — F41.9 ANXIETY DISORDER, UNSPECIFIED: ICD-10-CM

## 2023-08-16 DIAGNOSIS — M54.9 DORSALGIA, UNSPECIFIED: ICD-10-CM

## 2023-08-16 DIAGNOSIS — R63.4 ABNORMAL WEIGHT LOSS: ICD-10-CM

## 2023-08-16 DIAGNOSIS — M80.80XD OTHER OSTEOPOROSIS WITH CURRENT PATHOLOGICAL FRACTURE, UNSPECIFIED SITE, SUBSEQUENT ENCOUNTER FOR FRACTURE WITH ROUTINE HEALING: ICD-10-CM

## 2023-08-16 DIAGNOSIS — R73.02 IMPAIRED GLUCOSE TOLERANCE (ORAL): ICD-10-CM

## 2023-08-16 DIAGNOSIS — M80.00XD AGE-RELATED OSTEOPOROSIS WITH CURRENT PATHOLOGICAL FRACTURE, UNSPECIFIED SITE, SUBSEQUENT ENCOUNTER FOR FRACTURE WITH ROUTINE HEALING: ICD-10-CM

## 2023-08-16 DIAGNOSIS — M54.50 LOW BACK PAIN, UNSPECIFIED: ICD-10-CM

## 2023-08-16 DIAGNOSIS — E55.9 VITAMIN D DEFICIENCY, UNSPECIFIED: ICD-10-CM

## 2023-08-16 PROCEDURE — 99214 OFFICE O/P EST MOD 30 MIN: CPT

## 2023-08-16 PROCEDURE — ZZZZZ: CPT

## 2023-08-16 NOTE — HEALTH RISK ASSESSMENT
[No] : No [No falls in past year] : Patient reported no falls in the past year [0] : 2) Feeling down, depressed, or hopeless: Not at all (0) [de-identified] : Minimal [de-identified] : Stand [Reviewed no changes] : Reviewed, no changes

## 2023-08-16 NOTE — HISTORY OF PRESENT ILLNESS
[FreeTextEntry1] : Follow up, nutrition, back pain.  General health maintenance for this 67-year-old  female with history of low back pain elevated lipids elevated A1c and found to have osteoporosis.  History of weight loss 10 pound weight loss [de-identified] : 66 y/o F presents for a follow up, GHM Hx of concern over A1C, adjusted diet/exercise/lifestyle accordingly. Has seen Nutritionist. cc: back pain secondary to long walks- lumbar back pain, focal to the left side Had bone density and GYN eval. blood work reviewed and will be entered into chart.   Hx of asthma, uses rescue inhaler PRN. She states she is otherwise well, and voices no further complaints. Would like to see nutritionist.

## 2023-08-16 NOTE — REVIEW OF SYSTEMS
[Negative] : Heme/Lymph [Muscle Pain] : muscle pain [Recent Change In Weight] : ~T recent weight change [Back Pain] : back pain [FreeTextEntry2] : Weight loss

## 2023-08-16 NOTE — COUNSELING
[None] : None [Good understanding] : Patient has a good understanding of lifestyle changes and steps needed to achieve self management goal [Fall prevention counseling provided] : Fall prevention counseling provided [Adequate lighting] : Adequate lighting [No throw rugs] : No throw rugs [Use proper foot wear] : Use proper foot wear [Use recommended devices] : Use recommended devices [de-identified] : Total face-to-face time with patient - 15 minutes; >50% involved counselling, review of labs/tests, and/or coordination of medical care: Symptomatic patients : Test for influenza, if positive, treat for influenza and do not continue below.  1. Fever plus cough or shortness of breath : Test for RVP and COVID-19. 2.Indirect, circumstantial or unclear exposure to COVID-19, or other concerning cases not meeting above criteria: Please call [facility] to discuss testing.  +++ All above cases must be reported to the [facility] registry. +++  Asymptomatic patients:  1. Known first-degree direct-contact exposure to positive COVID-19 patient but asymptomatic: No testing PLUS 14 day self-quarantine. Pt to call if symptoms develop. Report to [facility] Registry. 2. No known exposure and asymptomatic, referred from outside healthcare organization: Please call [facility] to discuss testing.  3.All other asymptomatic patients with no known exposures: no testing, no exceptions.  - Discussed diabetes physiology - Discussed importance of monitoring blood glucose levels - Encouraged a low fat/low cholesterol diet - Discussed symptoms of hyperglycemia and hypoglycemia - Discussed ADA glucose goals - Discussed  HGB A1c and the effects of blood glucose on the level - Discussed Healthy eating, avoidance of concentrated sweets, and to include vegetables by at least 2 meals a day - Discussed regular exercise - Discussed importance of follow up physician visits Advised to take 1500mg per day of elemental calcium and 1,000 iu of vitamin D3, as well as a minimum of 3 hours per week of weight baring exercise.Repeat Bone Density in 2 years, as protocol indicates:        low chol diet. Avoid fried foods, red meat, butter, eggs, hard cheeses. Use canola or olive oil preferred.   - Encouraged a low fat/low cholesterol diet  - Discussed Healthy eating, avoidance of concentrated sweets, and to include vegetables by at least 3 meals a day  - encouraged low glycemic fruits, grains and vegetables and a diet high in plant protein  - Discussed regular exercise  - Discussed importance of follow up physician visits   diet and exercise weight loss.  Low-salt low-fat ADA diet/ htn- Discussed diabetes physiology - Discussed importance of monitoring blood glucose levels - Encouraged a low fat/low cholesterol diet - Discussed symptoms of hyperglycemia and hypoglycemia - Discussed ADA glucose goals - Discussed  HGB A1c and the effects of blood glucose on the level - Discussed Healthy eating, avoidance of concentrated sweets, and to include vegetables by at least 2 meals a day - Discussed regular exercise - Discussed importance of follow up physician visits Limit intake of Sodium (Salt) to less than 2 grams a day to prevent fluid retention-swelling or worsening of symptoms. The importance of keeping the blood pressure at or below 130/80 to prevent stroke, heart attacks, kidney failure, blindness, and loss of limbs was  low chol diet. Avoid fried foods, red meat, butter, eggs, hard cheeses. Use canola or olive oil preferred. ::  was established in which goals would be set, monitoring would be done, and problem solving would also be addressed. The patient would be assisted using behavior change techniques, such as self-help and counseling through behavioral modification: Problem solving using hypnosis and positive medical reinforcement to achieve agreed-upon goals.

## 2023-08-16 NOTE — PHYSICAL EXAM
[No Acute Distress] : no acute distress [Well Nourished] : well nourished [Well Developed] : well developed [Well-Appearing] : well-appearing [Normal Sclera/Conjunctiva] : normal sclera/conjunctiva [PERRL] : pupils equal round and reactive to light [EOMI] : extraocular movements intact [Normal Outer Ear/Nose] : the outer ears and nose were normal in appearance [Normal Oropharynx] : the oropharynx was normal [No JVD] : no jugular venous distention [No Lymphadenopathy] : no lymphadenopathy [Supple] : supple [Thyroid Normal, No Nodules] : the thyroid was normal and there were no nodules present [No Respiratory Distress] : no respiratory distress  [No Accessory Muscle Use] : no accessory muscle use [Clear to Auscultation] : lungs were clear to auscultation bilaterally [Normal Rate] : normal rate  [Regular Rhythm] : with a regular rhythm [Normal S1, S2] : normal S1 and S2 [No Murmur] : no murmur heard [No Carotid Bruits] : no carotid bruits [No Abdominal Bruit] : a ~M bruit was not heard ~T in the abdomen [No Varicosities] : no varicosities [Pedal Pulses Present] : the pedal pulses are present [No Edema] : there was no peripheral edema [No Palpable Aorta] : no palpable aorta [No Extremity Clubbing/Cyanosis] : no extremity clubbing/cyanosis [Soft] : abdomen soft [Non Tender] : non-tender [Non-distended] : non-distended [No Masses] : no abdominal mass palpated [No HSM] : no HSM [Normal Bowel Sounds] : normal bowel sounds [Normal Posterior Cervical Nodes] : no posterior cervical lymphadenopathy [Normal Anterior Cervical Nodes] : no anterior cervical lymphadenopathy [No Spinal Tenderness] : no spinal tenderness [No Joint Swelling] : no joint swelling [Grossly Normal Strength/Tone] : grossly normal strength/tone [No Rash] : no rash [Coordination Grossly Intact] : coordination grossly intact [No Focal Deficits] : no focal deficits [Normal Gait] : normal gait [Deep Tendon Reflexes (DTR)] : deep tendon reflexes were 2+ and symmetric [Normal Affect] : the affect was normal [Normal Insight/Judgement] : insight and judgment were intact [de-identified] : BMI 18 point [de-identified] : Creased range of motion spasm tenderness straight leg raise

## 2023-08-28 ENCOUNTER — APPOINTMENT (OUTPATIENT)
Dept: PHYSICAL MEDICINE AND REHAB | Facility: CLINIC | Age: 68
End: 2023-08-28
Payer: OTHER MISCELLANEOUS

## 2023-08-28 VITALS
WEIGHT: 100 LBS | SYSTOLIC BLOOD PRESSURE: 108 MMHG | BODY MASS INDEX: 18.4 KG/M2 | TEMPERATURE: 97.6 F | OXYGEN SATURATION: 97 % | RESPIRATION RATE: 17 BRPM | DIASTOLIC BLOOD PRESSURE: 62 MMHG | HEIGHT: 62 IN | HEART RATE: 66 BPM

## 2023-08-28 PROCEDURE — 99213 OFFICE O/P EST LOW 20 MIN: CPT

## 2023-08-31 NOTE — HISTORY OF PRESENT ILLNESS
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   She was in the process of pulling a full heavy rack of returned clothes. The wheels on the rack became stuck in the back would not move. She pulled in the rack to get it to move. She had a sharp pain in the right shoulder and neck. She tried to treat with rest, Advil/Tylenol and avoiding tasks that would aggravate right neck and shoulder She has persistent pain and presents for an evaluation  She attended restorative physical therapy for 22 sessions .  She has a new series of therapy for 12 sessions.  Therapy has improved the range of motion of her shoulder.  She still has weakness and pain at end range. Therapy was denied 2 weeks ago.   She has changed how her habits.  She no longer carries a purse over the right shoulder.  She uses a left arm more than the right.  She does not reach above the shoulder level with the right arm due to pain  She saw orthopedist  Dr. Lalo Casanova 3.20.2023.  The patient will follow-up with her orthopedist in the [facility], Dr. Ferraro to discuss further interventions if necessary to the right shoulder to assist with her persistent shoulder pain Dr. Casanova who reviewed the MRI of the right shoulder with the patient He performed an injection using right steroid shoulder last month he does not recommend surgery at this time.  He recommended to continue therapy  She continued to work at full work capacity at a [employer] which involves repetitive lifting.   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  Right shoulder pain Pain: 2-5 /10 depending on the activity. Worse: 10/10 Quality: sharp Frequency: constant Therapy has worked to increase the range of motion of her shoulder. She still has limitation at end range both in forward flexion/abduction and internal rotation The pain starts in the front of the front of the shoulder radiates down to the arm to the elbow and to the neck The pain is aggravated with use of her arm away from the body. She cannot manipulate objects on a shelf due to shoulder pain. She cannot lay on the right shoulder at night.   She previously had a Worker's Comp. injury on November 7, 2021 along the upper back and right shoulder.  She required restorative physical therapy.  She stopped therapy in March 2022.  She returned to work at full work capacity without further need of medical care  She has no previous injuries or Worker's Comp. cases to her neck  Functional deficits: She can perform ADLs but slowly.  She has increased pain with extremity dress and use of the right arm for ADLs such as brushing her teeth and combing her hair pushing off arms and   She did not take time off from work. She drove to the office. She does not like to take pain medications She continues to use topical medications/creams and ice or moist heat packs to the right shoulder for pain relief

## 2023-08-31 NOTE — PHYSICAL EXAM
[FreeTextEntry1] : Pleasant, in no distress. Language: English HEENT: Head: no trauma. Eyes: no discharge. Ears: No discharge. Nose No discharge. Throat: clear Neck: FAROM. Negative Spurlings Heart: RR, +S1, S2 Lungs: CTA Abdomen: soft, NT Thoracic spine.  She has tenderness on palpation of the paraspinal musclesover to the lateral chest wall Lumbar spine: FAROM, no spasm  LUE: Shoulder: FAROM, MS 5/5 Elbow: FAROM, MS 5/5 reflexes 2/4 Wrist: FAROM, MS 5/5 reflexes 2/4 Warm, nontender, pulse 2+  RUE:Shoulder:AROM FF/ABD  0-130.  IR 0 TO 20 DEGREES ER 0-40 MS 4/5 tender to palpation to of the left lateral shoulder and over the shoulder blade.  No ecchymosis. tender with spasm in the interscapular muscles Elbow: FAROM, MS 5/5 reflexes 2/4 Wrist: FAROM, MS 5/5 reflexes 2/4 Warm, nontender, pulse 2+  LLE: Hip: FAROM, MS 5/5 Knee: FAROM, MS 4+/5 tender to touch.  No instability.  mild Tender over the medial knee.  Negative Dimas's.  Resolving ecchymosis.  Reflexes 2/4 Ankle: FAROM, MS 5/5 reflexes 2/4 Warm , nontender, pulse 2+ negative homans  RLE: Hip: FAROM, MS 5/5 Knee: FAROM, MS 5/5 reflexes 2/4 Ankle: FAROM, MS 5/5 reflexes 2/4 Warm , nontender, pulse 2+ negative homans  Gait: Spontaneous, reciprocal, safe without an assistive device  Sensation RUE: sensation is intact to light touch, pinprick  and proprioception LUE: sensation is intact to light touch, pinprick  and proprioception RLE: sensation is intact to light touch, pinprick  and proprioception. Neg SLR. Neg JOSE RAMON, Neg ESAIR LLE: sensation is intact to light touch, pinprick  and proprioception. Neg SLR. Neg JOSE RAMON, Neg ESAIR

## 2023-08-31 NOTE — REVIEW OF SYSTEMS
[Joint Pain] : joint pain [Joint Stiffness] : joint stiffness [Muscle Pain] : muscle pain [Fever] : no fever [Eye Pain] : no eye pain [Chest Pain] : no chest pain [Earache] : no earache [Shortness Of Breath] : no shortness of breath [Abdominal Pain] : no abdominal pain [Dysuria] : no dysuria [Skin Wound] : no skin wound [Dizziness] : no dizziness [Insomnia] : no insomnia [Easy Bruising] : no tendency for easy bruising

## 2023-09-26 ENCOUNTER — APPOINTMENT (OUTPATIENT)
Dept: PHYSICAL MEDICINE AND REHAB | Facility: CLINIC | Age: 68
End: 2023-09-26
Payer: OTHER MISCELLANEOUS

## 2023-09-26 VITALS
DIASTOLIC BLOOD PRESSURE: 70 MMHG | SYSTOLIC BLOOD PRESSURE: 112 MMHG | HEIGHT: 62 IN | TEMPERATURE: 97.6 F | WEIGHT: 100 LBS | HEART RATE: 67 BPM | RESPIRATION RATE: 17 BRPM | BODY MASS INDEX: 18.4 KG/M2 | OXYGEN SATURATION: 98 %

## 2023-09-26 PROCEDURE — 99213 OFFICE O/P EST LOW 20 MIN: CPT

## 2023-10-30 ENCOUNTER — APPOINTMENT (OUTPATIENT)
Dept: PHYSICAL MEDICINE AND REHAB | Facility: CLINIC | Age: 68
End: 2023-10-30
Payer: OTHER MISCELLANEOUS

## 2023-10-30 VITALS
HEIGHT: 62 IN | WEIGHT: 98 LBS | RESPIRATION RATE: 17 BRPM | TEMPERATURE: 97.6 F | OXYGEN SATURATION: 99 % | SYSTOLIC BLOOD PRESSURE: 126 MMHG | HEART RATE: 66 BPM | BODY MASS INDEX: 18.03 KG/M2 | DIASTOLIC BLOOD PRESSURE: 80 MMHG

## 2023-10-30 PROCEDURE — 99214 OFFICE O/P EST MOD 30 MIN: CPT

## 2023-10-30 RX ORDER — DICLOFENAC SODIUM 1% 10 MG/G
1 GEL TOPICAL
Qty: 1 | Refills: 3 | Status: ACTIVE | COMMUNITY
Start: 2023-10-30 | End: 1900-01-01

## 2023-10-31 NOTE — PHYSICAL EXAM
10/31/2023  Alondra Bryan is a 66 y.o., female.      Pre-op Assessment    I have reviewed the Patient Summary Reports.     I have reviewed the Nursing Notes. I have reviewed the NPO Status.   I have reviewed the Medications.     Review of Systems  Anesthesia Hx:  No problems with previous Anesthesia    Social:  Former Smoker, Alcohol Use    Cardiovascular:   Hypertension Summary    ? The stress echo portion of this study is negative for myocardial ischemia.  ? Normal left ventricular systolic function. The estimated ejection fraction is 55%  ? Normal LV diastolic function.  ? Normal right ventricular systolic function.  ? The estimated PA systolic pressure is 27 mm Hg   Pulmonary:   Asthma mild Lung mass    Renal/:   Chronic Renal Disease renal angiomyolipoma   Hepatic/GI:   Bowel Prep. GERD    Musculoskeletal:   Arthritis     Neurological:  Neurology Normal    Endocrine:   Hypothyroidism        Physical Exam  General: Well nourished, Cooperative, Alert and Oriented    Airway:  Mallampati: II   Mouth Opening: Normal  TM Distance: Normal  Tongue: Normal  Neck ROM: Normal ROM    Dental:  Intact        Anesthesia Plan  Type of Anesthesia, risks & benefits discussed:    Anesthesia Type: Gen Natural Airway, MAC  Intra-op Monitoring Plan: Standard ASA Monitors  Induction:  IV  Informed Consent: Informed consent signed with the Patient and all parties understand the risks and agree with anesthesia plan.  All questions answered.   ASA Score: 2  Day of Surgery Review of History & Physical: H&P Update referred to the surgeon/provider.I have interviewed and examined the patient. I have reviewed the patient's H&P dated: There are no significant changes.     Ready For Surgery From Anesthesia Perspective.     .       [FreeTextEntry1] : Pleasant, in no distress. Language: English\par HEENT: Head: no trauma. Eyes: no discharge. Ears: No discharge. Nose No discharge. Throat: clear\par Neck: FAROM. Negative Spurlings\par Heart: RR, +S1, S2\par Lungs: CTA\par Abdomen: soft, NT\par Thoracic spine.  She has tenderness on palpation of the paraspinal musclesover to the lateral chest wall\par Lumbar spine: FAROM, no spasm\par \par LUE: Shoulder: FAROM, MS 5/5\par Elbow: FAROM, MS 5/5 reflexes 2/4\par Wrist: FAROM, MS 5/5 reflexes 2/4\par Warm, nontender, pulse 2+\par \par RUE:Shoulder:AROM FF/ABD  0-150.  IR 0 TO 10 DEGREES MS 4+/5 tender to palpation to of the left lateral shoulder and over the shoulder blade.  No ecchymosis. tender with spasm in the interscapular muscles\par Elbow: FAROM, MS 5/5 reflexes 2/4\par Wrist: FAROM, MS 5/5 reflexes 2/4\par Warm, nontender, pulse 2+\par \par LLE: Hip: FAROM, MS 5/5\par Knee: FAROM, MS 4+/5 tender to touch.  No instability.  mild Tender over the medial knee.  Negative Dimas's.  Resolving ecchymosis.  Reflexes 2/4\par Ankle: FAROM, MS 5/5 reflexes 2/4\par Warm , nontender, pulse 2+ negative homans\par \par RLE: Hip: FAROM, MS 5/5\par Knee: FAROM, MS 5/5 reflexes 2/4\par Ankle: FAROM, MS 5/5 reflexes 2/4\par Warm , nontender, pulse 2+ negative homans\par \par Gait: Spontaneous, reciprocal, safe without an assistive device\par \par Sensation\par RUE: sensation is intact to light touch, pinprick  and proprioception\par LUE: sensation is intact to light touch, pinprick  and proprioception\par RLE: sensation is intact to light touch, pinprick  and proprioception. Neg SLR. Neg JOSE RAMON, Neg FADIR\par LLE: sensation is intact to light touch, pinprick  and proprioception. Neg SLR. Neg JOSE RAMON, Neg FADIR\par \par

## 2023-11-27 ENCOUNTER — APPOINTMENT (OUTPATIENT)
Dept: INTERNAL MEDICINE | Facility: CLINIC | Age: 68
End: 2023-11-27
Payer: MEDICARE

## 2023-11-27 PROCEDURE — G0008: CPT

## 2023-11-27 PROCEDURE — 90662 IIV NO PRSV INCREASED AG IM: CPT

## 2023-12-04 ENCOUNTER — APPOINTMENT (OUTPATIENT)
Dept: INTERNAL MEDICINE | Facility: CLINIC | Age: 68
End: 2023-12-04
Payer: MEDICARE

## 2023-12-04 ENCOUNTER — NON-APPOINTMENT (OUTPATIENT)
Age: 68
End: 2023-12-04

## 2023-12-04 ENCOUNTER — APPOINTMENT (OUTPATIENT)
Dept: PHYSICAL MEDICINE AND REHAB | Facility: CLINIC | Age: 68
End: 2023-12-04
Payer: OTHER MISCELLANEOUS

## 2023-12-04 VITALS
SYSTOLIC BLOOD PRESSURE: 118 MMHG | HEART RATE: 68 BPM | BODY MASS INDEX: 19.14 KG/M2 | TEMPERATURE: 97.6 F | OXYGEN SATURATION: 98 % | HEIGHT: 62 IN | DIASTOLIC BLOOD PRESSURE: 80 MMHG | RESPIRATION RATE: 17 BRPM | WEIGHT: 104 LBS

## 2023-12-04 DIAGNOSIS — M81.8 OTHER OSTEOPOROSIS W/OUT CURRENT PATHOLOGICAL FRACTURE: ICD-10-CM

## 2023-12-04 DIAGNOSIS — J20.9 ACUTE BRONCHITIS, UNSPECIFIED: ICD-10-CM

## 2023-12-04 DIAGNOSIS — E78.5 HYPERLIPIDEMIA, UNSPECIFIED: ICD-10-CM

## 2023-12-04 DIAGNOSIS — Z00.00 ENCOUNTER FOR GENERAL ADULT MEDICAL EXAMINATION W/OUT ABNORMAL FINDINGS: ICD-10-CM

## 2023-12-04 PROCEDURE — 99214 OFFICE O/P EST MOD 30 MIN: CPT | Mod: 25

## 2023-12-04 PROCEDURE — G0439: CPT

## 2023-12-04 PROCEDURE — 99213 OFFICE O/P EST LOW 20 MIN: CPT

## 2023-12-04 RX ORDER — TERCONAZOLE 8 MG/G
0.8 CREAM VAGINAL
Qty: 1 | Refills: 2 | Status: ACTIVE | COMMUNITY
Start: 2023-12-04 | End: 1900-01-01

## 2023-12-04 RX ORDER — AZITHROMYCIN 250 MG/1
250 TABLET, FILM COATED ORAL
Qty: 1 | Refills: 0 | Status: ACTIVE | COMMUNITY
Start: 2023-12-04 | End: 1900-01-01

## 2023-12-04 RX ORDER — FLUCONAZOLE 150 MG/1
150 TABLET ORAL
Qty: 2 | Refills: 1 | Status: ACTIVE | COMMUNITY
Start: 2023-12-04 | End: 1900-01-01

## 2023-12-06 ENCOUNTER — TRANSCRIPTION ENCOUNTER (OUTPATIENT)
Age: 68
End: 2023-12-06

## 2023-12-06 PROBLEM — E78.5 LIPIDEMIA: Status: ACTIVE | Noted: 2023-07-19

## 2023-12-06 PROBLEM — M81.8 OTHER OSTEOPOROSIS: Status: ACTIVE | Noted: 2023-08-16

## 2023-12-06 PROBLEM — Z00.00 ENCOUNTER FOR PREVENTIVE HEALTH EXAMINATION: Status: ACTIVE | Noted: 2021-09-07

## 2023-12-06 LAB
RAPID RVP RESULT: NOT DETECTED
SARS-COV-2 RNA PNL RESP NAA+PROBE: NOT DETECTED

## 2023-12-21 ENCOUNTER — APPOINTMENT (OUTPATIENT)
Dept: INTERNAL MEDICINE | Facility: CLINIC | Age: 68
End: 2023-12-21

## 2023-12-25 NOTE — DATA REVIEWED
[FreeTextEntry1] : Rib x-rays performed on 11/24/2021 reveals no fracture  X-rays of the thoracic spine performed on 11/24/2021 reveals mild left adult scoliosis  X-ray of the right shoulder performed on November 17, 2022 reveals mild calcific bursitis  MRI of the right shoulder performed on May 11, 2023 reveals Mild to moderate supraspinatus tendinosis with a small focus of calcific tendinitis at the posterior aspect of the insertion.  There is fraying of the articular margin.  There is no discrete tear.  There is mild overlying subacromial/subdeltoid bursitis.

## 2023-12-25 NOTE — PHYSICAL EXAM
[FreeTextEntry1] : Pleasant, in no distress. Language: English HEENT: Head: no trauma. Eyes: no discharge. Ears: No discharge. Nose No discharge. Throat: clear Neck: FAROM. Negative Spurlings Heart: RR, +S1, S2 Lungs: CTA Abdomen: soft, NT Thoracic spine.  She has tenderness on palpation of the paraspinal muscles over to the lateral chest wall Lumbar spine: FAROM, no spasm  LUE: Shoulder: FAROM, MS 5/5 Elbow: FAROM, MS 5/5 reflexes 2/4 Wrist: FAROM, MS 5/5 reflexes 2/4 Warm, nontender, pulse 2+  RUE:Shoulder:AROM FF/ABD  0-100.  IR 0 TO 20 DEGREES ER 0-30 MS 4-/5 tender to palpation to of the left lateral shoulder and over the shoulder blade.  No ecchymosis. tender with spasm in the interscapular muscles Elbow: FAROM, MS 5/5 reflexes 2/4 Wrist: FAROM, MS 5/5 reflexes 2/4 Warm, nontender, pulse 2+  LLE: Hip: FAROM, MS 5/5 Knee: FAROM, MS 4+/5 tender to touch.  No instability.  mild Tender over the medial knee.  Negative Dimas's.  Resolving ecchymosis.  Reflexes 2/4 Ankle: FAROM, MS 5/5 reflexes 2/4 Warm , nontender, pulse 2+ negative homans  RLE: Hip: FAROM, MS 5/5 Knee: FAROM, MS 5/5 reflexes 2/4 Ankle: FAROM, MS 5/5 reflexes 2/4 Warm , nontender, pulse 2+ negative homans  Gait: Spontaneous, reciprocal, safe without an assistive device  Sensation RUE: sensation is intact to light touch, pinprick  and proprioception LUE: sensation is intact to light touch, pinprick  and proprioception RLE: sensation is intact to light touch, pinprick  and proprioception. Neg SLR. Neg JOSE RAMON, Neg ESAIR LLE: sensation is intact to light touch, pinprick  and proprioception. Neg SLR. Neg JOSE RAMON, Neg ESAIR

## 2023-12-25 NOTE — HISTORY OF PRESENT ILLNESS
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   She was in the process of pulling a full heavy rack of returned clothes. The wheels on the rack became stuck in the back would not move. She pulled in the rack to get it to move. She had a sharp pain in the right shoulder and neck. She tried to treat with rest, Advil/Tylenol and avoiding tasks that would aggravate right neck and shoulder She has persistent pain and presents for an evaluation  She attended restorative physical therapy for 22 sessions .  Therapy had improved the range of motion of her shoulder.  She still has weakness and pain at end range. She has not had therapy in at least 4 months.. She performs a self range of motion exercise program to the best of her ability. She is losing range of her right shoulder. The loss of range of motion is affecting her present ability to perform work  She has changed how her habits.  She no longer carries a purse over the right shoulder.  She uses a left arm more than the right.  She does not reach above the shoulder level with the right arm due to pain  Followed up with her orthopedist Dr. Casanova.  Dr. Casanova who reviewed the MRI of the right shoulder with the patient He performed an injection using right steroid shoulder.  He does not recommend surgery at this time.  He recommended to continue therapy.  Patient has been denied therapy in the last 6 weeks  She continued to work at full work capacity at a [employer] which involves repetitive lifting.   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  Right shoulder pain Pain: 7 /10 depending on the activity. Worse: 10/10 Quality: sharp Frequency: constant Therapy has worked to increase the range of motion of her shoulder. She still has limitation at end range both in forward flexion/abduction and internal rotation The pain starts in the front of the front of the shoulder radiates down to the arm to the elbow and to the neck The pain is aggravated with use of her arm away from the body. She cannot manipulate objects on a shelf due to shoulder pain. She cannot lay on the right shoulder at night.   She previously had a Worker's Comp. injury on November 7, 2021 along the upper back and right shoulder.  She required restorative physical therapy.  She stopped therapy in March 2022.  She returned to work at full work capacity without further need of medical care  She has no previous injuries or Worker's Comp. cases to her neck  Functional deficits: She can perform ADLs but slowly.  She has increased pain with extremity dress and use of the right arm for ADLs such as brushing her teeth and combing her hair pushing off arms and   She did not take time off from work. She drove to the office. She does not like to take pain medications She continues to use topical medications/creams and ice or moist heat packs to the right shoulder for pain relief

## 2024-01-08 ENCOUNTER — APPOINTMENT (OUTPATIENT)
Dept: PHYSICAL MEDICINE AND REHAB | Facility: CLINIC | Age: 69
End: 2024-01-08

## 2024-02-27 ENCOUNTER — APPOINTMENT (OUTPATIENT)
Dept: PHYSICAL MEDICINE AND REHAB | Facility: CLINIC | Age: 69
End: 2024-02-27
Payer: OTHER MISCELLANEOUS

## 2024-02-27 VITALS
RESPIRATION RATE: 18 BRPM | TEMPERATURE: 97.6 F | HEART RATE: 70 BPM | BODY MASS INDEX: 19.88 KG/M2 | HEIGHT: 62 IN | DIASTOLIC BLOOD PRESSURE: 70 MMHG | OXYGEN SATURATION: 98 % | SYSTOLIC BLOOD PRESSURE: 112 MMHG | WEIGHT: 108 LBS

## 2024-02-27 PROCEDURE — 99213 OFFICE O/P EST LOW 20 MIN: CPT

## 2024-03-04 ENCOUNTER — APPOINTMENT (OUTPATIENT)
Dept: INTERNAL MEDICINE | Facility: CLINIC | Age: 69
End: 2024-03-04

## 2024-03-12 NOTE — HISTORY OF PRESENT ILLNESS
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   She was in the process of pulling a full heavy rack of returned clothes. The wheels on the rack became stuck in the back would not move. She pulled in the rack to get it to move. She had a sharp pain in the right shoulder and neck. She tried to treat with rest, Advil/Tylenol and avoiding tasks that would aggravate right neck and shoulder She has persistent pain and presents for an evaluation  She attended restorative physical therapy for 22 sessions .  Therapy had improved the range of motion of her shoulder.  She still has weakness and pain at end range. She has not had therapy in at least 7 months She performs a self range of motion exercise program to the best of her ability. She is losing range of her right shoulder. The loss of range of motion is affecting her present ability to perform work. She recently received approval from Worker's Compensation for another course of restorative physical therapy to the right shoulder  Dr. Casanova who reviewed the MRI of the right shoulder with the patient He performed an injection using right steroid shoulder.  He does not recommend surgery at this time.  He recommended to continue therapy.  She continued to work at full work capacity at a [employer] which involves repetitive lifting.   The right shoulder pain interferes with her ability to perform her job as a  front end customer experience coordinator for [employer].  She was unable to perform her job capacity at [employer] and I changed her job.  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  Right shoulder pain Pain: 5 /10 depending on the activity. Worse: 10/10 Quality: sharp Frequency: constant Therapy has worked to increase the range of motion of her shoulder. She still has limitation at end range both in forward flexion/abduction and internal rotation The pain starts in the front of the front of the shoulder radiates down to the arm to the elbow and to the neck The pain is aggravated with use of her arm away from the body. She cannot manipulate objects on a shelf due to shoulder pain. She cannot lay on the right shoulder at night.  She has increased pain in the left shoulder as a result of compensating for the decreased amount of work the right shoulder due to secondary to pain. She previously had a Worker's Comp. injury on November 7, 2021 along the upper back and right shoulder.  She required restorative physical therapy.  She stopped therapy in March 2022.  She returned to work at full work capacity without further need of medical care  She has no previous injuries or Worker's Comp. cases to her neck  Functional deficits: She can perform ADLs but slowly.  She has increased pain with extremity dress and use of the right arm for ADLs such as brushing her teeth and combing her hair pushing off arms and   She did not take time off from work. She drove to the office. She does not like to take pain medications She continues to use topical medications/creams and ice or moist heat packs to the right shoulder for pain relief

## 2024-04-02 ENCOUNTER — APPOINTMENT (OUTPATIENT)
Dept: PHYSICAL MEDICINE AND REHAB | Facility: CLINIC | Age: 69
End: 2024-04-02

## 2024-04-02 ENCOUNTER — APPOINTMENT (OUTPATIENT)
Dept: PHYSICAL MEDICINE AND REHAB | Facility: CLINIC | Age: 69
End: 2024-04-02
Payer: OTHER MISCELLANEOUS

## 2024-04-02 VITALS
WEIGHT: 109 LBS | BODY MASS INDEX: 20.06 KG/M2 | SYSTOLIC BLOOD PRESSURE: 118 MMHG | HEIGHT: 62 IN | DIASTOLIC BLOOD PRESSURE: 64 MMHG | OXYGEN SATURATION: 96 % | RESPIRATION RATE: 18 BRPM | HEART RATE: 63 BPM

## 2024-04-02 PROCEDURE — 99213 OFFICE O/P EST LOW 20 MIN: CPT

## 2024-04-30 NOTE — HISTORY OF PRESENT ILLNESS
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   She was in the process of pulling a full heavy rack of returned clothes. The wheels on the rack became stuck in the back would not move. She pulled in the rack to get it to move. She had a sharp pain in the right shoulder and neck. She tried to treat with rest, Advil/Tylenol and avoiding tasks that would aggravate right neck and shoulder She has persistent pain and presents for an evaluation  She attended restorative physical therapy for 22 sessions .  Therapy had improved the range of motion of her shoulder.  She still has weakness and pain at end range. She has not had therapy in at least 7 months She performs a self range of motion exercise program to the best of her ability. She is losing range of her right shoulder. The loss of range of motion is affecting her present ability to perform work. She recently received approval from Worker's Compensation for another course of restorative physical therapy to the right shoulder  She was attending restorative therapy 2 x week fpr  received 7/12. She missed 1 session of therapy due to a stomach virus. Her arm is less painful with range of motion. She has less difficulty with upper extremity dress. She cannot lay of the right shoulder   Dr. Casanova who reviewed the MRI of the right shoulder with the patient He performed an injection using right steroid shoulder.  He does not recommend surgery at this time.  He recommended to continue therapy.  She continued to work at full work capacity at a [employer] which involves repetitive lifting.   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  She was unable to perform her job capacity at [employer] and I changed her job.  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  Right shoulder pain Pain: 4 /10 depending on the activity. Worse: 10/10 Quality: sharp Frequency: constant Therapy has worked to increase the range of motion of her shoulder. She still has limitation at end range both in forward flexion/abduction and internal rotation The pain starts in the front of the front of the shoulder radiates down to the arm to the elbow and to the neck The pain is aggravated with use of her arm away from the body. She cannot manipulate objects on a shelf due to shoulder pain. She cannot lay on the right shoulder at night.  She has increased pain in the left shoulder as a result of compensating for the decreased amount of work the right shoulder due to secondary to pain. She previously had a Worker's Comp. injury on November 7, 2021 along the upper back and right shoulder.  She required restorative physical therapy.  She stopped therapy in March 2022.  She returned to work at full work capacity without further need of medical care  She has no previous injuries or Worker's Comp. cases to her neck  Functional deficits: She can perform ADLs but slowly.  She has increased pain with extremity dress and use of the right arm for ADLs such as brushing her teeth and combing her hair pushing off arms and   She did not take time off from work. She drove to the office. She does not like to take pain medications She continues to use topical medications/creams and ice or moist heat packs to the right shoulder for pain relief

## 2024-04-30 NOTE — PHYSICAL EXAM
[FreeTextEntry1] : Pleasant, in no distress. Language: English HEENT: Head: no trauma. Eyes: no discharge. Ears: No discharge. Nose No discharge. Throat: clear Neck: FAROM. Negative Spurlings Heart: RR, +S1, S2 Lungs: CTA Abdomen: soft, NT Thoracic spine.  She has tenderness on palpation of the paraspinal muscles over to the lateral chest wall Lumbar spine: FAROM, no spasm  LUE: Shoulder: FAROM, MS 5/5 Elbow: FAROM, MS 5/5 reflexes 2/4 Wrist: FAROM, MS 5/5 reflexes 2/4 Warm, nontender, pulse 2+  RUE:Shoulder:AROM FF/ABD  0-120.  IR 0 TO 20 DEGREES ER 0-30 MS 4/5 tender to palpation to of the left lateral shoulder and over the shoulder blade.  No ecchymosis. tender with spasm in the interscapular muscles Elbow: FAROM, MS 5/5 reflexes 2/4 Wrist: FAROM, MS 5/5 reflexes 2/4 Warm, nontender, pulse 2+  LLE: Hip: FAROM, MS 5/5 Knee: FAROM, MS 4+/5 tender to touch.  No instability.  mild Tender over the medial knee.  Negative Dimas's.  Resolving ecchymosis.  Reflexes 2/4 Ankle: FAROM, MS 5/5 reflexes 2/4 Warm , nontender, pulse 2+ negative homans  RLE: Hip: FAROM, MS 5/5 Knee: FAROM, MS 5/5 reflexes 2/4 Ankle: FAROM, MS 5/5 reflexes 2/4 Warm , nontender, pulse 2+ negative homans  Gait: Spontaneous, reciprocal, safe without an assistive device  Sensation RUE: sensation is intact to light touch, pinprick  and proprioception LUE: sensation is intact to light touch, pinprick  and proprioception RLE: sensation is intact to light touch, pinprick  and proprioception. Neg SLR. Neg JOSE RAMON, Neg ESAIR LLE: sensation is intact to light touch, pinprick  and proprioception. Neg SLR. Neg JOSE RAMON, Neg ESAIR

## 2024-04-30 NOTE — REVIEW OF SYSTEMS
[Fever] : no fever [Eye Pain] : no eye pain [Chest Pain] : no chest pain [Earache] : no earache [Shortness Of Breath] : no shortness of breath [Abdominal Pain] : no abdominal pain [Dysuria] : no dysuria [Skin Wound] : no skin wound [Dizziness] : no dizziness [Insomnia] : no insomnia [Easy Bruising] : no tendency for easy bruising

## 2024-05-06 ENCOUNTER — APPOINTMENT (OUTPATIENT)
Dept: PHYSICAL MEDICINE AND REHAB | Facility: CLINIC | Age: 69
End: 2024-05-06
Payer: OTHER MISCELLANEOUS

## 2024-05-06 VITALS
BODY MASS INDEX: 20.06 KG/M2 | DIASTOLIC BLOOD PRESSURE: 70 MMHG | HEIGHT: 62 IN | RESPIRATION RATE: 17 BRPM | SYSTOLIC BLOOD PRESSURE: 112 MMHG | WEIGHT: 109 LBS | HEART RATE: 66 BPM | OXYGEN SATURATION: 97 % | TEMPERATURE: 98.4 F

## 2024-05-06 DIAGNOSIS — M25.511 PAIN IN RIGHT SHOULDER: ICD-10-CM

## 2024-05-06 DIAGNOSIS — Z02.6 ENCOUNTER FOR EXAMINATION FOR INSURANCE PURPOSES: ICD-10-CM

## 2024-05-06 DIAGNOSIS — Z00.8 ENCOUNTER FOR OTHER GENERAL EXAMINATION: ICD-10-CM

## 2024-05-06 PROCEDURE — 99213 OFFICE O/P EST LOW 20 MIN: CPT

## 2024-05-14 PROBLEM — Z02.6 ENCOUNTER RELATED TO WORKER'S COMPENSATION CLAIM: Status: ACTIVE | Noted: 2021-11-28

## 2024-05-14 PROBLEM — M25.511 ACUTE PAIN OF RIGHT SHOULDER: Status: ACTIVE | Noted: 2021-11-24

## 2024-05-14 PROBLEM — Z00.8 ENCOUNTER FOR WORK CAPABILITY ASSESSMENT: Status: ACTIVE | Noted: 2021-11-28

## 2024-05-14 NOTE — PHYSICAL EXAM
[FreeTextEntry1] : Pleasant, in no distress. Language: English HEENT: Head: no trauma. Eyes: no discharge. Ears: No discharge. Nose No discharge. Throat: clear Neck: FAROM. Negative Spurlings Heart: RR, +S1, S2 Lungs: CTA Abdomen: soft, NT Thoracic spine.  She has tenderness on palpation of the paraspinal muscles over to the lateral chest wall Lumbar spine: FAROM, no spasm  LUE: Shoulder: FAROM, MS 5/5 Elbow: FAROM, MS 5/5 reflexes 2/4 Wrist: FAROM, MS 5/5 reflexes 2/4 Warm, nontender, pulse 2+  RUE:Shoulder:AROM FF/ABD  0-130.  IR 0 TO 40 DEGREES ER 0-40 MS 4/5 tender to palpation to of the left lateral shoulder and over the shoulder blade.  No ecchymosis. tender with spasm in the interscapular muscles Elbow: FAROM, MS 5/5 reflexes 2/4 Wrist: FAROM, MS 5/5 reflexes 2/4 Warm, nontender, pulse 2+  LLE: Hip: FAROM, MS 5/5 Knee: FAROM, MS 4+/5 tender to touch.  No instability.  mild Tender over the medial knee.  Negative Dimas's.  Resolving ecchymosis.  Reflexes 2/4 Ankle: FAROM, MS 5/5 reflexes 2/4 Warm , nontender, pulse 2+ negative homans  RLE: Hip: FAROM, MS 5/5 Knee: FAROM, MS 5/5 reflexes 2/4 Ankle: FAROM, MS 5/5 reflexes 2/4 Warm , nontender, pulse 2+ negative homans  Gait: Spontaneous, reciprocal, safe without an assistive device  Sensation RUE: sensation is intact to light touch, pinprick  and proprioception LUE: sensation is intact to light touch, pinprick  and proprioception RLE: sensation is intact to light touch, pinprick  and proprioception. Neg SLR. Neg JOSE RAMON, Neg ESAIR LLE: sensation is intact to light touch, pinprick  and proprioception. Neg SLR. Neg JOSE RAMON, Neg ESAIR

## 2024-05-14 NOTE — HISTORY OF PRESENT ILLNESS
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   She was in the process of pulling a full heavy rack of returned clothes. The wheels on the rack became stuck in the back would not move. She pulled in the rack to get it to move. She had a sharp pain in the right shoulder and neck. She tried to treat with rest, Advil/Tylenol and avoiding tasks that would aggravate right neck and shoulder She has persistent pain and presents for an evaluation  She still has weakness and pain at end range. She has not had therapy in at least 7 months She performs a self range of motion exercise program to the best of her ability. She is losing range of her right shoulder. She has increased pain The loss of range of motion is affecting her present ability to perform work. She recently received approval from Worker's Compensation for another course of restorative physical therapy to the right shoulder  Dr. Casanova who reviewed the MRI of the right shoulder with the patient He performed an injection using right steroid shoulder.  He does not recommend surgery at this time.  He recommended to continue therapy.  She continued to work at full work capacity at a [employer] which involves repetitive lifting.   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  She was unable to perform her job capacity at [employer] and I changed her job.  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  She has been approved for restorative therapy. She was attending restorative therapy 2 x week for  received for 12 sessions total.  Therapy has improved the range of motion of her shoulder.  She has less pain with range of motion of the shoulder.  Functional improvements She has less difficulty with upper extremity dress. Was pain with overhead activities such as combing her hair.He continues to have limitations in range of motion and pain but still impact activities above the shoulder level. She has been denied further restorative therapies.  Level 1 and level 2 reviews by Worker's Compensation states there is a lack of documentation for range of motion even though it is documented that she has an increase in range of motion at least 20 degrees In the range of motion of her right shoulder And abduction from the note of February 27, 2024 through the note of April 2, 2024.  They state that this range of motion could be achieved with just a home excise program. It is also documented in the notes that when she did not have therapy and try to perform a home excise program for 7 months she lost range of motion of the right shoulder and increased pain.  Right shoulder pain Pain: 4 /10 depending on the activity. Worse: 10/10 Quality: sharp Frequency: constant Therapy has worked to increase the range of motion of her shoulder. She still has limitation at end range both in forward flexion/abduction and internal rotation The pain starts in the front of the front of the shoulder radiates down to the arm to the elbow and to the neck The pain is aggravated with use of her arm away from the body. She cannot manipulate objects on a shelf due to shoulder pain. She cannot lay on the right shoulder at night.  She has increased pain in the left shoulder as a result of compensating for the decreased amount of work the right shoulder due to secondary to pain. She previously had a Worker's Comp. injury on November 7, 2021 along the upper back and right shoulder.  She required restorative physical therapy.  She stopped therapy in March 2022.  She returned to work at full work capacity without further need of medical care  She has no previous injuries or Worker's Comp. cases to her neck  Functional deficits: She can perform ADLs but slowly.  She has increased pain with extremity dress and use of the right arm for ADLs such as brushing her teeth and combing her hair pushing off arms and   She did not take time off from work. She drove to the office. She does not like to take pain medications She continues to use topical medications/creams and ice or moist heat packs to the right shoulder for pain relief He has used Advil at least once a week over the last 4 weeks.

## 2024-05-14 NOTE — HISTORY OF PRESENT ILLNESS
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   She was in the process of pulling a full heavy rack of returned clothes. The wheels on the rack became stuck in the back would not move. She pulled in the rack to get it to move. She had a sharp pain in the right shoulder and neck. She tried to treat with rest, Advil/Tylenol and avoiding tasks that would aggravate right neck and shoulder She has persistent pain and presents for an evaluation  She still has weakness and pain at end range. She has not had therapy in at least 7 months She performs a self range of motion exercise program to the best of her ability. She is losing range of her right shoulder. She has increased pain The loss of range of motion is affecting her present ability to perform work. She recently received approval from Worker's Compensation for another course of restorative physical therapy to the right shoulder  Dr. Casanova who reviewed the MRI of the right shoulder with the patient He performed an injection using right steroid shoulder.  He does not recommend surgery at this time.  He recommended to continue therapy.  She continued to work at full work capacity at a [employer] which involves repetitive lifting.   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  She was unable to perform her job capacity at [employer] and I changed her job.  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  She has been approved for restorative therapy. She was attending restorative therapy 2 x week for  received for 12 sessions total.  Therapy has improved the range of motion of her shoulder.  She has less pain with range of motion of the shoulder.  Functional improvements She has less difficulty with upper extremity dress. Was pain with overhead activities such as combing her hair.He continues to have limitations in range of motion and pain but still impact activities above the shoulder level. She has been denied further restorative therapies.  Level 1 and level 2 reviews by Worker's Compensation states there is a lack of documentation for range of motion even though it is documented that she has an increase in range of motion at least 20 degrees In the range of motion of her right shoulder And abduction from the note of February 27, 2024 through the note of April 2, 2024.  They state that this range of motion could be achieved with just a home excise program. It is also documented in the notes that when she did not have therapy and try to perform a home excise program for 7 months she lost range of motion of the right shoulder and increased pain.  Right shoulder pain Pain: 4 /10 depending on the activity. Worse: 10/10 Quality: sharp Frequency: constant Therapy has worked to increase the range of motion of her shoulder. She still has limitation at end range both in forward flexion/abduction and internal rotation The pain starts in the front of the front of the shoulder radiates down to the arm to the elbow and to the neck The pain is aggravated with use of her arm away from the body. She cannot manipulate objects on a shelf due to shoulder pain. She cannot lay on the right shoulder at night.  She has increased pain in the left shoulder as a result of compensating for the decreased amount of work the right shoulder due to secondary to pain. She previously had a Worker's Comp. injury on November 7, 2021 along the upper back and right shoulder.  She required restorative physical therapy.  She stopped therapy in March 2022.  She returned to work at full work capacity without further need of medical care  She has no previous injuries or Worker's Comp. cases to her neck  Functional deficits: She can perform ADLs but slowly.  She has increased pain with extremity dress and use of the right arm for ADLs such as brushing her teeth and combing her hair pushing off arms and   She did not take time off from work. She drove to the office. She does not like to take pain medications She continues to use topical medications/creams and ice or moist heat packs to the right shoulder for pain relief He has used Advil at least once a week over the last 4 weeks.

## 2024-08-02 ENCOUNTER — APPOINTMENT (OUTPATIENT)
Dept: INTERNAL MEDICINE | Facility: CLINIC | Age: 69
End: 2024-08-02
Payer: COMMERCIAL

## 2024-08-02 PROCEDURE — 36415 COLL VENOUS BLD VENIPUNCTURE: CPT

## 2024-08-04 LAB
ALBUMIN SERPL ELPH-MCNC: 4.4 G/DL
ALP BLD-CCNC: 73 U/L
ALT SERPL-CCNC: 28 U/L
ANION GAP SERPL CALC-SCNC: 11 MMOL/L
AST SERPL-CCNC: 27 U/L
BASOPHILS # BLD AUTO: 0.06 K/UL
BASOPHILS NFR BLD AUTO: 1.4 %
BILIRUB SERPL-MCNC: 0.5 MG/DL
BUN SERPL-MCNC: 16 MG/DL
CALCIUM SERPL-MCNC: 9.8 MG/DL
CHLORIDE SERPL-SCNC: 103 MMOL/L
CHOLEST SERPL-MCNC: 154 MG/DL
CO2 SERPL-SCNC: 25 MMOL/L
CREAT SERPL-MCNC: 0.61 MG/DL
EGFR: 99 ML/MIN/1.73M2
EOSINOPHIL # BLD AUTO: 0.09 K/UL
EOSINOPHIL NFR BLD AUTO: 2.1 %
ESTIMATED AVERAGE GLUCOSE: 114 MG/DL
GLUCOSE SERPL-MCNC: 91 MG/DL
HBA1C MFR BLD HPLC: 5.6 %
HCT VFR BLD CALC: 42.5 %
HDLC SERPL-MCNC: 93 MG/DL
HGB BLD-MCNC: 13.1 G/DL
IMM GRANULOCYTES NFR BLD AUTO: 0 %
LDLC SERPL CALC-MCNC: 51 MG/DL
LYMPHOCYTES # BLD AUTO: 1.68 K/UL
LYMPHOCYTES NFR BLD AUTO: 38.7 %
MAN DIFF?: NORMAL
MCHC RBC-ENTMCNC: 30.8 GM/DL
MCHC RBC-ENTMCNC: 31.5 PG
MCV RBC AUTO: 102.2 FL
MONOCYTES # BLD AUTO: 0.43 K/UL
MONOCYTES NFR BLD AUTO: 9.9 %
NEUTROPHILS # BLD AUTO: 2.08 K/UL
NEUTROPHILS NFR BLD AUTO: 47.9 %
NONHDLC SERPL-MCNC: 61 MG/DL
PLATELET # BLD AUTO: 254 K/UL
POTASSIUM SERPL-SCNC: 4.5 MMOL/L
PROT SERPL-MCNC: 6.4 G/DL
RBC # BLD: 4.16 M/UL
RBC # FLD: 13.7 %
SODIUM SERPL-SCNC: 139 MMOL/L
T3 SERPL-MCNC: 84 NG/DL
TRIGL SERPL-MCNC: 44 MG/DL
TSH SERPL-ACNC: 4.2 UIU/ML
WBC # FLD AUTO: 4.34 K/UL

## 2024-08-07 ENCOUNTER — APPOINTMENT (OUTPATIENT)
Dept: INTERNAL MEDICINE | Facility: CLINIC | Age: 69
End: 2024-08-07

## 2024-08-07 PROBLEM — Z87.898 HISTORY OF WEIGHT LOSS: Status: RESOLVED | Noted: 2023-08-16 | Resolved: 2024-08-07

## 2024-08-07 PROBLEM — R63.4 WEIGHT LOSS: Status: ACTIVE | Noted: 2024-08-07

## 2024-08-07 PROCEDURE — G2211 COMPLEX E/M VISIT ADD ON: CPT

## 2024-08-07 PROCEDURE — ZZZZZ: CPT

## 2024-08-07 PROCEDURE — 99401 PREV MED CNSL INDIV APPRX 15: CPT

## 2024-08-07 PROCEDURE — 99204 OFFICE O/P NEW MOD 45 MIN: CPT

## 2024-08-07 PROCEDURE — 36415 COLL VENOUS BLD VENIPUNCTURE: CPT

## 2024-08-07 NOTE — PHYSICAL EXAM
[No Acute Distress] : no acute distress [Well Nourished] : well nourished [Well Developed] : well developed [Well-Appearing] : well-appearing [Normal Sclera/Conjunctiva] : normal sclera/conjunctiva [PERRL] : pupils equal round and reactive to light [EOMI] : extraocular movements intact [Normal Outer Ear/Nose] : the outer ears and nose were normal in appearance [Normal Oropharynx] : the oropharynx was normal [No JVD] : no jugular venous distention [No Lymphadenopathy] : no lymphadenopathy [Supple] : supple [Thyroid Normal, No Nodules] : the thyroid was normal and there were no nodules present [No Respiratory Distress] : no respiratory distress  [No Accessory Muscle Use] : no accessory muscle use [Clear to Auscultation] : lungs were clear to auscultation bilaterally [Normal Rate] : normal rate  [Regular Rhythm] : with a regular rhythm [Normal S1, S2] : normal S1 and S2 [No Carotid Bruits] : no carotid bruits [No Murmur] : no murmur heard [No Abdominal Bruit] : a ~M bruit was not heard ~T in the abdomen [No Varicosities] : no varicosities [Pedal Pulses Present] : the pedal pulses are present [No Edema] : there was no peripheral edema [No Palpable Aorta] : no palpable aorta [No Extremity Clubbing/Cyanosis] : no extremity clubbing/cyanosis [Soft] : abdomen soft [Non Tender] : non-tender [Non-distended] : non-distended [No Masses] : no abdominal mass palpated [No HSM] : no HSM [Normal Bowel Sounds] : normal bowel sounds [Normal Posterior Cervical Nodes] : no posterior cervical lymphadenopathy [Normal Anterior Cervical Nodes] : no anterior cervical lymphadenopathy [No CVA Tenderness] : no CVA  tenderness [No Spinal Tenderness] : no spinal tenderness [No Joint Swelling] : no joint swelling [Grossly Normal Strength/Tone] : grossly normal strength/tone [No Rash] : no rash [Coordination Grossly Intact] : coordination grossly intact [No Focal Deficits] : no focal deficits [Normal Gait] : normal gait [Deep Tendon Reflexes (DTR)] : deep tendon reflexes were 2+ and symmetric [Normal Affect] : the affect was normal [Normal Insight/Judgement] : insight and judgment were intact [Declined Rectal Exam] : declined rectal exam [Normal] : normal gait, coordination grossly intact, no focal deficits and deep tendon reflexes were 2+ and symmetric [Alert and Oriented x3] : oriented to person, place, and time [de-identified] : BMI is 19.2 patient is lost 4 pounds since May

## 2024-08-07 NOTE — HISTORY OF PRESENT ILLNESS
[FreeTextEntry1] : follow up / ghm for this 67 y/o w/f patient comes for diabetic management we will see the nutritionist is concerned about her A1c.  Is also concerned about osteoporosis and treatment plan.  Requesting Xanax refill for anxiety.  Uses albuterol for asthma but is stable at this time [de-identified] : 67 y/o F presents for a follow up, [facility] Patient here for A1C follow up. Will also see the nutritionist today.  Hx of concern over A1C, adjusted diet/exercise/lifestyle accordingly. Has seen Nutritionist. Hx of asthma, uses rescue inhaler PRN. Patient denies fever chills cough chest pain or shortness of.  Breath, patient admits to weight loss but is intentional Voices no further complaints ROS as noted below Denies fever, cough, chills, body aches and SOB  I Sole Jc, am scribing for and in the presence of Dr. Noel, the following sections of:  HISTORY OF PRESENT ILLNESS, PAST MEDICAL/FAMILY/SOCIAL HISTORY, ROS, VITALS, PE, DISPOSITION

## 2024-08-07 NOTE — COUNSELING
[Fall prevention counseling provided] : Fall prevention counseling provided [Adequate lighting] : Adequate lighting [No throw rugs] : No throw rugs [Use proper foot wear] : Use proper foot wear [Use recommended devices] : Use recommended devices [Behavioral health counseling provided] : Behavioral health counseling provided [Sleep ___ hours/day] : Sleep [unfilled] hours/day [Engage in a relaxing activity] : Engage in a relaxing activity [Plan in advance] : Plan in advance [None] : None [Good understanding] : Patient has a good understanding of lifestyle changes and steps needed to achieve self management goal [de-identified] : Total face-to-face time with patient - 15 minutes; >50% involved counselling, review of labs/tests, and/or coordination of medical care: - Discussed diabetes physiology - Discussed importance of monitoring blood glucose levels - Encouraged a low fat/low cholesterol diet - Discussed symptoms of hyperglycemia and hypoglycemia - Discussed ADA glucose goals - Discussed  HGB A1c and the effects of blood glucose on the level - Discussed Healthy eating, avoidance of concentrated sweets, and to include vegetables by at least 2 meals a day - Discussed regular exercise - Discussed importance of follow up physician visits Advised to take 1500mg per day of elemental calcium and 1,000 iu of vitamin D3, as well as a minimum of 3 hours per week of weight baring exercise.Repeat Bone Density in 2 years, as protocol indicates:            Symptomatic patients : Test for influenza, if positive, treat for influenza and do not continue below.  1. Fever plus cough or shortness of breath : Test for RVP and COVID-19. 2.Indirect, circumstantial or unclear exposure to COVID-19, or other concerning cases not meeting above criteria: Please call [facility] to discuss testing.  +++ All above cases must be reported to the [facility] registry. +++  Asymptomatic patients:  1. Known first-degree direct-contact exposure to positive COVID-19 patient but asymptomatic: No testing PLUS 14 day self-quarantine. Pt to call if symptoms develop. Report to [facility] Registry. 2. No known exposure and asymptomatic, referred from outside healthcare organization: Please call [facility] to discuss testing.  3.All other asymptomatic patients with no known exposures: no testing, no exceptions.  diet and exercise weight loss.  Low-salt low-fat ADA diet/ htn- Discussed diabetes physiology - Discussed importance of monitoring blood glucose levels - Encouraged a low fat/low cholesterol diet - Discussed symptoms of hyperglycemia and hypoglycemia - Discussed ADA glucose goals - Discussed  HGB A1c and the effects of blood glucose on the level - Discussed Healthy eating, avoidance of concentrated sweets, and to include vegetables by at least 2 meals a day - Discussed regular exercise - Discussed importance of follow up physician visits Limit intake of Sodium (Salt) to less than 2 grams a day to prevent fluid retention-swelling or worsening of symptoms. The importance of keeping the blood pressure at or below 130/80 to prevent stroke, heart attacks, kidney failure, blindness, and loss of limbs was  low chol diet. Avoid fried foods, red meat, butter, eggs, hard cheeses. Use canola or olive oil preferred. ::  was established in which goals would be set, monitoring would be done, and problem solving would also be addressed. The patient would be assisted using behavior change techniques, such as self-help and counseling through behavioral modification: Problem solving using hypnosis and positive medical reinforcement to achieve agreed-upon goals.

## 2024-08-07 NOTE — END OF VISIT
[FreeTextEntry4] : I Sole Jc, am scribing for and in the presence of Dr. Noel, the following sections of:  HISTORY OF PRESENT ILLNESS, PAST MEDICAL/FAMILY/SOCIAL HISTORY, ROS, VITALS, PE, DISPOSITION

## 2024-08-07 NOTE — HEALTH RISK ASSESSMENT
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  Will see a rheumatologist for osteoporosis [de-identified] : Patient [de-identified] : Appears to be on a low calorie ADA diet [Reviewed no changes] : Reviewed, no changes [I will adhere to the patient's wishes.] : I will adhere to the patient's wishes. [Never] : Never

## 2024-08-22 ENCOUNTER — APPOINTMENT (OUTPATIENT)
Dept: PHYSICAL MEDICINE AND REHAB | Facility: CLINIC | Age: 69
End: 2024-08-22

## 2024-09-19 ENCOUNTER — APPOINTMENT (OUTPATIENT)
Dept: PHYSICAL MEDICINE AND REHAB | Facility: CLINIC | Age: 69
End: 2024-09-19

## 2024-09-19 VITALS
RESPIRATION RATE: 17 BRPM | OXYGEN SATURATION: 97 % | WEIGHT: 101 LBS | BODY MASS INDEX: 18.58 KG/M2 | DIASTOLIC BLOOD PRESSURE: 76 MMHG | HEART RATE: 77 BPM | SYSTOLIC BLOOD PRESSURE: 124 MMHG | HEIGHT: 62 IN | TEMPERATURE: 98.3 F

## 2024-09-19 DIAGNOSIS — Z02.6 ENCOUNTER FOR EXAMINATION FOR INSURANCE PURPOSES: ICD-10-CM

## 2024-09-19 DIAGNOSIS — M25.511 PAIN IN RIGHT SHOULDER: ICD-10-CM

## 2024-09-19 DIAGNOSIS — Z00.8 ENCOUNTER FOR OTHER GENERAL EXAMINATION: ICD-10-CM

## 2024-09-19 PROCEDURE — 99213 OFFICE O/P EST LOW 20 MIN: CPT

## 2024-09-19 NOTE — HISTORY OF PRESENT ILLNESS
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   She was in the process of pulling a full heavy rack of returned clothes. The wheels on the rack became stuck in the back would not move. She pulled in the rack to get it to move. She had a sharp pain in the right shoulder and neck. She tried to treat with rest, Advil/Tylenol and avoiding tasks that would aggravate right neck and shoulder She has persistent pain and presents for an evaluation  She still has weakness and pain at end range. She has not had therapy in at least 7 months She performs a self range of motion exercise program to the best of her ability. She is losing range of her right shoulder. She has increased pain The loss of range of motion is affecting her present ability to perform work. She recently received approval from Worker's Compensation for another course of restorative physical therapy to the right shoulder  Dr. Casanova who reviewed the MRI of the right shoulder with the patient He performed an injection using right steroid shoulder.  He does not recommend surgery at this time.  He recommended to continue therapy.  She continued to work at full work capacity at a [employer] which involves repetitive lifting.   The right shoulder pain interferes with her ability to perform her job as a  front end customer experience coordinator for [employer].  She was unable to perform her job capacity at [employer] and I changed her job.  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  She has been approved for restorative therapy. She was attending restorative therapy 2 x week for  received for 12 sessions total.  Therapy has improved the range of motion of her shoulder.  She has less pain with range of motion of the shoulder.  Functional improvements She has less difficulty with upper extremity dress. Was pain with overhead activities such as combing her hair.He continues to have limitations in range of motion and pain but still impact activities above the shoulder level. She has been denied further restorative therapies.  Level 1 and level 2 reviews by Worker's Compensation states there is a lack of documentation for range of motion even though it is documented that she has an increase in range of motion at least 20 degrees In the range of motion of her right shoulder And abduction from the note of February 27, 2024 through the note of April 2, 2024.  They state that this range of motion could be achieved with just a home excise program. It is also documented in the notes that when she did not have therapy and try to perform a home excise program for 7 months she lost range of motion of the right shoulder and increased pain. +++ she has increased pain over the last thre weeks/ She cannot recall incting event / Careful about lifting.  Pain is worse with use of the asrm above the shoulder  She use advil 2 tablets 1 x weeks no therapy sincer her last office visit.   0-130  use ice / heat Right shoulder pain Pain: 6 -10/10 depending on the activity. Worse: 10/10 Quality: sharp Frequency: constant Therapy has worked to increase the range of motion of her shoulder. She still has limitation at end range both in forward flexion/abduction and internal rotation The pain starts in the front of the front of the shoulder radiates down to the arm to the elbow and to the neck The pain is aggravated with use of her arm away from the body. She cannot manipulate objects on a shelf due to shoulder pain. She cannot lay on the right shoulder at night.  She has increased pain in the left shoulder as a result of compensating for the decreased amount of work the right shoulder due to secondary to pain. She previously had a Worker's Comp. injury on November 7, 2021 along the upper back and right shoulder.  She required restorative physical therapy.  She stopped therapy in March 2022.  She returned to work at full work capacity without further need of medical care  She has no previous injuries or Worker's Comp. cases to her neck  Functional deficits: She can perform ADLs but slowly.  She has increased pain with extremity dress and use of the right arm for ADLs such as brushing her teeth and combing her hair pushing off arms and   She did not take time off from work. She drove to the office. She does not like to take pain medications She continues to use topical medications/creams and ice or moist heat packs to the right shoulder for pain relief He has used Advil at least once a week over the last 4 weeks.

## 2025-04-07 ENCOUNTER — APPOINTMENT (OUTPATIENT)
Dept: INTERNAL MEDICINE | Facility: CLINIC | Age: 70
End: 2025-04-07
Payer: COMMERCIAL

## 2025-04-07 ENCOUNTER — LABORATORY RESULT (OUTPATIENT)
Age: 70
End: 2025-04-07

## 2025-04-07 ENCOUNTER — NON-APPOINTMENT (OUTPATIENT)
Age: 70
End: 2025-04-07

## 2025-04-07 VITALS
BODY MASS INDEX: 18.58 KG/M2 | HEART RATE: 76 BPM | RESPIRATION RATE: 17 BRPM | DIASTOLIC BLOOD PRESSURE: 80 MMHG | OXYGEN SATURATION: 97 % | WEIGHT: 101 LBS | TEMPERATURE: 98.5 F | SYSTOLIC BLOOD PRESSURE: 130 MMHG | HEIGHT: 62 IN

## 2025-04-07 DIAGNOSIS — F41.9 ANXIETY DISORDER, UNSPECIFIED: ICD-10-CM

## 2025-04-07 DIAGNOSIS — M81.8 OTHER OSTEOPOROSIS W/OUT CURRENT PATHOLOGICAL FRACTURE: ICD-10-CM

## 2025-04-07 DIAGNOSIS — E78.5 HYPERLIPIDEMIA, UNSPECIFIED: ICD-10-CM

## 2025-04-07 DIAGNOSIS — J45.909 UNSPECIFIED ASTHMA, UNCOMPLICATED: ICD-10-CM

## 2025-04-07 DIAGNOSIS — Z00.00 ENCOUNTER FOR GENERAL ADULT MEDICAL EXAMINATION W/OUT ABNORMAL FINDINGS: ICD-10-CM

## 2025-04-07 PROCEDURE — 93000 ELECTROCARDIOGRAM COMPLETE: CPT

## 2025-04-07 PROCEDURE — 99397 PER PM REEVAL EST PAT 65+ YR: CPT

## 2025-04-07 PROCEDURE — 36415 COLL VENOUS BLD VENIPUNCTURE: CPT

## 2025-04-08 RX ORDER — ROSUVASTATIN CALCIUM 10 MG/1
10 TABLET, FILM COATED ORAL
Qty: 30 | Refills: 5 | Status: ACTIVE | COMMUNITY
Start: 2025-04-08

## 2025-04-08 RX ORDER — EZETIMIBE 10 MG/1
10 TABLET ORAL
Qty: 90 | Refills: 0 | Status: ACTIVE | COMMUNITY
Start: 2025-04-08

## 2025-04-11 LAB
25(OH)D3 SERPL-MCNC: 26.6 NG/ML
ALBUMIN SERPL ELPH-MCNC: 4.3 G/DL
ALP BLD-CCNC: 84 U/L
ALT SERPL-CCNC: 20 U/L
ANION GAP SERPL CALC-SCNC: 12 MMOL/L
APO B SERPL-MCNC: 49 MG/DL
APPEARANCE: CLEAR
AST SERPL-CCNC: 24 U/L
BASOPHILS # BLD AUTO: 0.07 K/UL
BASOPHILS NFR BLD AUTO: 1.1 %
BILIRUB SERPL-MCNC: 0.4 MG/DL
BILIRUBIN URINE: NEGATIVE
BLOOD URINE: ABNORMAL
BUN SERPL-MCNC: 19 MG/DL
CALCIUM SERPL-MCNC: 9.6 MG/DL
CHLORIDE SERPL-SCNC: 102 MMOL/L
CHOLEST SERPL-MCNC: 158 MG/DL
CO2 SERPL-SCNC: 24 MMOL/L
COLOR: YELLOW
CREAT SERPL-MCNC: 0.53 MG/DL
CRP SERPL HS-MCNC: 0.59 MG/L
EGFRCR SERPLBLD CKD-EPI 2021: 101 ML/MIN/1.73M2
EOSINOPHIL # BLD AUTO: 0.12 K/UL
EOSINOPHIL NFR BLD AUTO: 1.9 %
ESTIMATED AVERAGE GLUCOSE: 126 MG/DL
GLUCOSE QUALITATIVE U: NEGATIVE MG/DL
GLUCOSE SERPL-MCNC: 90 MG/DL
HBA1C MFR BLD HPLC: 6 %
HCT VFR BLD CALC: 41.1 %
HCYS SERPL-MCNC: 7.4 UMOL/L
HDLC SERPL-MCNC: 91 MG/DL
HGB BLD-MCNC: 13.4 G/DL
IMM GRANULOCYTES NFR BLD AUTO: 0.3 %
KETONES URINE: NEGATIVE MG/DL
LDLC SERPL-MCNC: 56 MG/DL
LEUKOCYTE ESTERASE URINE: NEGATIVE
LYMPHOCYTES # BLD AUTO: 2.28 K/UL
LYMPHOCYTES NFR BLD AUTO: 35.8 %
MAN DIFF?: NORMAL
MCHC RBC-ENTMCNC: 31.1 PG
MCHC RBC-ENTMCNC: 32.6 G/DL
MCV RBC AUTO: 95.4 FL
MONOCYTES # BLD AUTO: 0.61 K/UL
MONOCYTES NFR BLD AUTO: 9.6 %
NEUTROPHILS # BLD AUTO: 3.27 K/UL
NEUTROPHILS NFR BLD AUTO: 51.3 %
NITRITE URINE: NEGATIVE
NONHDLC SERPL-MCNC: 66 MG/DL
PH URINE: 7
PLATELET # BLD AUTO: 312 K/UL
POTASSIUM SERPL-SCNC: 4.2 MMOL/L
PROT SERPL-MCNC: 7.2 G/DL
PROTEIN URINE: NEGATIVE MG/DL
RBC # BLD: 4.31 M/UL
RBC # FLD: 12.8 %
SODIUM SERPL-SCNC: 138 MMOL/L
SPECIFIC GRAVITY URINE: 1.01
T3 SERPL-MCNC: 109 NG/DL
T4 FREE SERPL-MCNC: 1.1 NG/DL
TRIGL SERPL-MCNC: 51 MG/DL
TSH SERPL-ACNC: 3.44 UIU/ML
UROBILINOGEN URINE: 0.2 MG/DL
VIT B12 SERPL-MCNC: 781 PG/ML
WBC # FLD AUTO: 6.37 K/UL

## 2025-06-03 ENCOUNTER — APPOINTMENT (OUTPATIENT)
Dept: PHYSICAL MEDICINE AND REHAB | Facility: CLINIC | Age: 70
End: 2025-06-03
Payer: OTHER MISCELLANEOUS

## 2025-06-03 VITALS
BODY MASS INDEX: 19.51 KG/M2 | HEART RATE: 63 BPM | RESPIRATION RATE: 17 BRPM | TEMPERATURE: 97.3 F | SYSTOLIC BLOOD PRESSURE: 112 MMHG | WEIGHT: 106 LBS | DIASTOLIC BLOOD PRESSURE: 60 MMHG | HEIGHT: 62 IN | OXYGEN SATURATION: 95 %

## 2025-06-03 PROCEDURE — 99215 OFFICE O/P EST HI 40 MIN: CPT

## 2025-06-26 ENCOUNTER — TRANSCRIPTION ENCOUNTER (OUTPATIENT)
Age: 70
End: 2025-06-26